# Patient Record
Sex: FEMALE | Race: BLACK OR AFRICAN AMERICAN | NOT HISPANIC OR LATINO | URBAN - METROPOLITAN AREA
[De-identification: names, ages, dates, MRNs, and addresses within clinical notes are randomized per-mention and may not be internally consistent; named-entity substitution may affect disease eponyms.]

---

## 2017-01-08 ENCOUNTER — EMERGENCY (EMERGENCY)
Facility: HOSPITAL | Age: 52
LOS: 1 days | Discharge: HOME | End: 2017-01-08
Attending: EMERGENCY MEDICINE | Admitting: EMERGENCY MEDICINE
Payer: MEDICARE

## 2017-01-08 VITALS
OXYGEN SATURATION: 97 % | SYSTOLIC BLOOD PRESSURE: 151 MMHG | TEMPERATURE: 98 F | WEIGHT: 210.1 LBS | HEART RATE: 84 BPM | RESPIRATION RATE: 18 BRPM | HEIGHT: 65 IN | DIASTOLIC BLOOD PRESSURE: 93 MMHG

## 2017-01-08 VITALS
HEART RATE: 62 BPM | RESPIRATION RATE: 17 BRPM | OXYGEN SATURATION: 98 % | SYSTOLIC BLOOD PRESSURE: 121 MMHG | TEMPERATURE: 98 F | DIASTOLIC BLOOD PRESSURE: 75 MMHG

## 2017-01-08 DIAGNOSIS — I10 ESSENTIAL (PRIMARY) HYPERTENSION: ICD-10-CM

## 2017-01-08 DIAGNOSIS — M25.561 PAIN IN RIGHT KNEE: ICD-10-CM

## 2017-01-08 DIAGNOSIS — Z79.899 OTHER LONG TERM (CURRENT) DRUG THERAPY: ICD-10-CM

## 2017-01-08 DIAGNOSIS — Z88.5 ALLERGY STATUS TO NARCOTIC AGENT: ICD-10-CM

## 2017-01-08 DIAGNOSIS — Z79.2 LONG TERM (CURRENT) USE OF ANTIBIOTICS: ICD-10-CM

## 2017-01-08 DIAGNOSIS — E11.9 TYPE 2 DIABETES MELLITUS WITHOUT COMPLICATIONS: ICD-10-CM

## 2017-01-08 PROCEDURE — 99283 EMERGENCY DEPT VISIT LOW MDM: CPT | Mod: 25

## 2017-01-08 PROCEDURE — 73562 X-RAY EXAM OF KNEE 3: CPT | Mod: 26,RT

## 2017-01-08 PROCEDURE — 73562 X-RAY EXAM OF KNEE 3: CPT

## 2017-01-08 PROCEDURE — 99283 EMERGENCY DEPT VISIT LOW MDM: CPT

## 2017-01-08 RX ORDER — KETOROLAC TROMETHAMINE 30 MG/ML
30 SYRINGE (ML) INJECTION ONCE
Qty: 0 | Refills: 0 | Status: DISCONTINUED | OUTPATIENT
Start: 2017-01-08 | End: 2017-01-08

## 2017-01-08 NOTE — ED ADULT NURSE NOTE - PMH
Essential hypertension  Hypertension  Hyperparathyroidism  Hyperparathyroidism  Nontraumatic extradural hemorrhage  L2-L3  Thyrotoxicosis  Hyperthyroidism  Type 2 diabetes mellitus  Diabetes mellitus

## 2017-01-08 NOTE — ED ADULT TRIAGE NOTE - CHIEF COMPLAINT QUOTE
"I have fluid in my right knee for couple months but pain got really bad yesterday." Pt denies fever, chills. Pt has ambulated to ED.

## 2017-01-08 NOTE — ED PROVIDER NOTE - MEDICAL DECISION MAKING DETAILS
knee pain. neurovascular intact. pain meds given. no evidence of infection no exam. x-ray. no fx. +DJD. d/w ace wrap, cane, pain meds and f/u with ortho

## 2017-01-08 NOTE — ED ADULT NURSE NOTE - OBJECTIVE STATEMENT
Patient presents to the ED complaining of right knee pain, swelling, hardness and pain with ROM. Patient states that the pain and swelling has been going on for 6 months, reports that there is fluid in the knee and she needs a knee replacement. Denies any fever or chills. Swelling noted. Pedal pulse present.

## 2017-01-08 NOTE — ED PROVIDER NOTE - PSH
History of total knee replacement  S/P total knee replacement  Other postprocedural status  S/P spinal surgery

## 2017-01-08 NOTE — ED PROVIDER NOTE - OBJECTIVE STATEMENT
50 y/o female with hx of chronic back pain and arthritis to knee c/o right knee pain. pt states told she needs a knee replacement but has not had it done yet. pt reports increase in swelling past 2 days. no trauma. no fever or chills. no numbness or tingling. no further complaints.

## 2017-03-01 ENCOUNTER — RECORD ABSTRACTING (OUTPATIENT)
Age: 52
End: 2017-03-01

## 2017-03-01 DIAGNOSIS — M15.9 POLYOSTEOARTHRITIS, UNSPECIFIED: ICD-10-CM

## 2017-03-01 DIAGNOSIS — Z80.9 FAMILY HISTORY OF MALIGNANT NEOPLASM, UNSPECIFIED: ICD-10-CM

## 2017-04-05 ENCOUNTER — OUTPATIENT (OUTPATIENT)
Dept: OUTPATIENT SERVICES | Facility: HOSPITAL | Age: 52
LOS: 1 days | End: 2017-04-05

## 2017-04-05 DIAGNOSIS — Z22.321 CARRIER OR SUSPECTED CARRIER OF METHICILLIN SUSCEPTIBLE STAPHYLOCOCCUS AUREUS: ICD-10-CM

## 2017-04-06 ENCOUNTER — RESULT REVIEW (OUTPATIENT)
Age: 52
End: 2017-04-06

## 2017-04-06 VITALS
OXYGEN SATURATION: 99 % | WEIGHT: 206.35 LBS | HEIGHT: 65 IN | HEART RATE: 48 BPM | SYSTOLIC BLOOD PRESSURE: 150 MMHG | TEMPERATURE: 98 F | DIASTOLIC BLOOD PRESSURE: 76 MMHG | RESPIRATION RATE: 16 BRPM

## 2017-04-06 LAB
MRSA PCR RESULT.: NEGATIVE — SIGNIFICANT CHANGE UP
S AUREUS DNA NOSE QL NAA+PROBE: NEGATIVE — SIGNIFICANT CHANGE UP

## 2017-04-06 RX ORDER — AMOXICILLIN 250 MG/5ML
1 SUSPENSION, RECONSTITUTED, ORAL (ML) ORAL
Qty: 0 | Refills: 0 | COMMUNITY

## 2017-04-06 NOTE — PATIENT PROFILE ADULT. - PSH
History of total knee replacement  S/P total knee replacement  Other postprocedural status  S/P spinal surgery History of breast surgery  bilateral- Feb.2016/ March 2016  History of total knee replacement  S/P total knee replacement  Other postprocedural status  S/P spinal surgery

## 2017-04-06 NOTE — PATIENT PROFILE ADULT. - PMH
Essential hypertension  Hypertension  Hyperparathyroidism  Hyperparathyroidism  Nontraumatic extradural hemorrhage  L2-L3  Thyrotoxicosis  Hyperthyroidism  Type 2 diabetes mellitus  Diabetes mellitus  pt states she is prediabetic

## 2017-04-07 ENCOUNTER — INPATIENT (INPATIENT)
Facility: HOSPITAL | Age: 52
LOS: 5 days | Discharge: NURSING, REHAB OR LONG-TERM CARE | DRG: 470 | End: 2017-04-13
Attending: ORTHOPAEDIC SURGERY | Admitting: ORTHOPAEDIC SURGERY
Payer: MEDICARE

## 2017-04-07 ENCOUNTER — APPOINTMENT (OUTPATIENT)
Dept: ORTHOPEDIC SURGERY | Facility: HOSPITAL | Age: 52
End: 2017-04-07

## 2017-04-07 DIAGNOSIS — M17.11 UNILATERAL PRIMARY OSTEOARTHRITIS, RIGHT KNEE: ICD-10-CM

## 2017-04-07 DIAGNOSIS — I10 ESSENTIAL (PRIMARY) HYPERTENSION: ICD-10-CM

## 2017-04-07 DIAGNOSIS — Z98.890 OTHER SPECIFIED POSTPROCEDURAL STATES: Chronic | ICD-10-CM

## 2017-04-07 DIAGNOSIS — J44.9 CHRONIC OBSTRUCTIVE PULMONARY DISEASE, UNSPECIFIED: ICD-10-CM

## 2017-04-07 DIAGNOSIS — E11.9 TYPE 2 DIABETES MELLITUS WITHOUT COMPLICATIONS: ICD-10-CM

## 2017-04-07 DIAGNOSIS — E05.90 THYROTOXICOSIS, UNSPECIFIED WITHOUT THYROTOXIC CRISIS OR STORM: ICD-10-CM

## 2017-04-07 DIAGNOSIS — E21.3 HYPERPARATHYROIDISM, UNSPECIFIED: ICD-10-CM

## 2017-04-07 DIAGNOSIS — E55.9 VITAMIN D DEFICIENCY, UNSPECIFIED: ICD-10-CM

## 2017-04-07 LAB
DOHLE BOD BLD QL SMEAR: SIGNIFICANT CHANGE UP
EOSINOPHIL NFR BLD AUTO: 1 % — SIGNIFICANT CHANGE UP (ref 0–6)
HCT VFR BLD CALC: 39.3 % — SIGNIFICANT CHANGE UP (ref 34.5–45)
HGB BLD-MCNC: 13 G/DL — SIGNIFICANT CHANGE UP (ref 11.5–15.5)
LYMPHOCYTES # BLD AUTO: 16 % — SIGNIFICANT CHANGE UP (ref 13–44)
MANUAL DIF COMMENT BLD-IMP: SIGNIFICANT CHANGE UP
MANUAL SMEAR VERIFICATION: SIGNIFICANT CHANGE UP
MCHC RBC-ENTMCNC: 27.7 PG — SIGNIFICANT CHANGE UP (ref 27–34)
MCHC RBC-ENTMCNC: 33.1 G/DL — SIGNIFICANT CHANGE UP (ref 32–36)
MCV RBC AUTO: 83.8 FL — SIGNIFICANT CHANGE UP (ref 80–100)
METAMYELOCYTES # FLD: 2 % — HIGH
MONOCYTES NFR BLD AUTO: 4 % — SIGNIFICANT CHANGE UP (ref 2–14)
MYELOCYTES NFR BLD: 1 % — HIGH
NEUTROPHILS NFR BLD AUTO: 76 % — SIGNIFICANT CHANGE UP (ref 43–77)
NRBC # BLD: 1 /100 WBCS — HIGH
PLAT MORPH BLD: NORMAL — SIGNIFICANT CHANGE UP
PLATELET # BLD AUTO: 194 K/UL — SIGNIFICANT CHANGE UP (ref 150–400)
RBC # BLD: 4.69 M/UL — SIGNIFICANT CHANGE UP (ref 3.8–5.2)
RBC # FLD: 13.8 % — SIGNIFICANT CHANGE UP (ref 10.3–16.9)
RBC BLD AUTO: NORMAL — SIGNIFICANT CHANGE UP
WBC # BLD: 7.6 K/UL — SIGNIFICANT CHANGE UP (ref 3.8–10.5)
WBC # FLD AUTO: 7.6 K/UL — SIGNIFICANT CHANGE UP (ref 3.8–10.5)

## 2017-04-07 PROCEDURE — 27447 TOTAL KNEE ARTHROPLASTY: CPT | Mod: RT

## 2017-04-07 PROCEDURE — 20985 CPTR-ASST DIR MS PX: CPT | Mod: RT

## 2017-04-07 PROCEDURE — 73560 X-RAY EXAM OF KNEE 1 OR 2: CPT | Mod: 26,RT

## 2017-04-07 RX ORDER — HYDROMORPHONE HYDROCHLORIDE 2 MG/ML
1 INJECTION INTRAMUSCULAR; INTRAVENOUS; SUBCUTANEOUS
Qty: 0 | Refills: 0 | Status: DISCONTINUED | OUTPATIENT
Start: 2017-04-07 | End: 2017-04-08

## 2017-04-07 RX ORDER — DEXTROSE 50 % IN WATER 50 %
25 SYRINGE (ML) INTRAVENOUS ONCE
Qty: 0 | Refills: 0 | Status: DISCONTINUED | OUTPATIENT
Start: 2017-04-07 | End: 2017-04-13

## 2017-04-07 RX ORDER — MAGNESIUM HYDROXIDE 400 MG/1
30 TABLET, CHEWABLE ORAL DAILY
Qty: 0 | Refills: 0 | Status: DISCONTINUED | OUTPATIENT
Start: 2017-04-07 | End: 2017-04-13

## 2017-04-07 RX ORDER — HYDROMORPHONE HYDROCHLORIDE 2 MG/ML
1.5 INJECTION INTRAMUSCULAR; INTRAVENOUS; SUBCUTANEOUS
Qty: 0 | Refills: 0 | Status: DISCONTINUED | OUTPATIENT
Start: 2017-04-07 | End: 2017-04-08

## 2017-04-07 RX ORDER — FERROUS SULFATE 325(65) MG
325 TABLET ORAL
Qty: 0 | Refills: 0 | Status: DISCONTINUED | OUTPATIENT
Start: 2017-04-07 | End: 2017-04-13

## 2017-04-07 RX ORDER — ACETAMINOPHEN 500 MG
1000 TABLET ORAL ONCE
Qty: 0 | Refills: 0 | Status: COMPLETED | OUTPATIENT
Start: 2017-04-07 | End: 2017-04-07

## 2017-04-07 RX ORDER — SODIUM CHLORIDE 9 MG/ML
1000 INJECTION, SOLUTION INTRAVENOUS
Qty: 0 | Refills: 0 | Status: DISCONTINUED | OUTPATIENT
Start: 2017-04-07 | End: 2017-04-13

## 2017-04-07 RX ORDER — DEXTROSE 50 % IN WATER 50 %
12.5 SYRINGE (ML) INTRAVENOUS ONCE
Qty: 0 | Refills: 0 | Status: DISCONTINUED | OUTPATIENT
Start: 2017-04-07 | End: 2017-04-13

## 2017-04-07 RX ORDER — ASPIRIN/CALCIUM CARB/MAGNESIUM 324 MG
325 TABLET ORAL
Qty: 0 | Refills: 0 | Status: DISCONTINUED | OUTPATIENT
Start: 2017-04-07 | End: 2017-04-13

## 2017-04-07 RX ORDER — GLUCAGON INJECTION, SOLUTION 0.5 MG/.1ML
1 INJECTION, SOLUTION SUBCUTANEOUS ONCE
Qty: 0 | Refills: 0 | Status: DISCONTINUED | OUTPATIENT
Start: 2017-04-07 | End: 2017-04-13

## 2017-04-07 RX ORDER — ATORVASTATIN CALCIUM 80 MG/1
1 TABLET, FILM COATED ORAL
Qty: 0 | Refills: 0 | COMMUNITY

## 2017-04-07 RX ORDER — ONDANSETRON 8 MG/1
4 TABLET, FILM COATED ORAL EVERY 6 HOURS
Qty: 0 | Refills: 0 | Status: DISCONTINUED | OUTPATIENT
Start: 2017-04-07 | End: 2017-04-07

## 2017-04-07 RX ORDER — ZOLPIDEM TARTRATE 10 MG/1
5 TABLET ORAL AT BEDTIME
Qty: 0 | Refills: 0 | Status: DISCONTINUED | OUTPATIENT
Start: 2017-04-07 | End: 2017-04-13

## 2017-04-07 RX ORDER — ONDANSETRON 8 MG/1
4 TABLET, FILM COATED ORAL EVERY 6 HOURS
Qty: 0 | Refills: 0 | Status: DISCONTINUED | OUTPATIENT
Start: 2017-04-07 | End: 2017-04-13

## 2017-04-07 RX ORDER — DEXTROSE 50 % IN WATER 50 %
1 SYRINGE (ML) INTRAVENOUS ONCE
Qty: 0 | Refills: 0 | Status: DISCONTINUED | OUTPATIENT
Start: 2017-04-07 | End: 2017-04-13

## 2017-04-07 RX ORDER — DOCUSATE SODIUM 100 MG
100 CAPSULE ORAL THREE TIMES A DAY
Qty: 0 | Refills: 0 | Status: DISCONTINUED | OUTPATIENT
Start: 2017-04-07 | End: 2017-04-13

## 2017-04-07 RX ORDER — POLYETHYLENE GLYCOL 3350 17 G/17G
17 POWDER, FOR SOLUTION ORAL DAILY
Qty: 0 | Refills: 0 | Status: DISCONTINUED | OUTPATIENT
Start: 2017-04-07 | End: 2017-04-13

## 2017-04-07 RX ORDER — INSULIN LISPRO 100/ML
VIAL (ML) SUBCUTANEOUS
Qty: 0 | Refills: 0 | Status: DISCONTINUED | OUTPATIENT
Start: 2017-04-07 | End: 2017-04-13

## 2017-04-07 RX ORDER — PANTOPRAZOLE SODIUM 20 MG/1
40 TABLET, DELAYED RELEASE ORAL DAILY
Qty: 0 | Refills: 0 | Status: DISCONTINUED | OUTPATIENT
Start: 2017-04-07 | End: 2017-04-13

## 2017-04-07 RX ORDER — CEFAZOLIN SODIUM 1 G
2000 VIAL (EA) INJECTION EVERY 8 HOURS
Qty: 0 | Refills: 0 | Status: COMPLETED | OUTPATIENT
Start: 2017-04-07 | End: 2017-04-08

## 2017-04-07 RX ORDER — NALOXONE HYDROCHLORIDE 4 MG/.1ML
0.1 SPRAY NASAL
Qty: 0 | Refills: 0 | Status: DISCONTINUED | OUTPATIENT
Start: 2017-04-07 | End: 2017-04-13

## 2017-04-07 RX ORDER — HYDROMORPHONE HYDROCHLORIDE 2 MG/ML
0.5 INJECTION INTRAMUSCULAR; INTRAVENOUS; SUBCUTANEOUS
Qty: 0 | Refills: 0 | Status: DISCONTINUED | OUTPATIENT
Start: 2017-04-07 | End: 2017-04-07

## 2017-04-07 RX ORDER — ATORVASTATIN CALCIUM 80 MG/1
20 TABLET, FILM COATED ORAL AT BEDTIME
Qty: 0 | Refills: 0 | Status: DISCONTINUED | OUTPATIENT
Start: 2017-04-07 | End: 2017-04-13

## 2017-04-07 RX ORDER — HYDROMORPHONE HYDROCHLORIDE 2 MG/ML
30 INJECTION INTRAMUSCULAR; INTRAVENOUS; SUBCUTANEOUS
Qty: 0 | Refills: 0 | Status: DISCONTINUED | OUTPATIENT
Start: 2017-04-07 | End: 2017-04-08

## 2017-04-07 RX ORDER — BUPIVACAINE 13.3 MG/ML
20 INJECTION, SUSPENSION, LIPOSOMAL INFILTRATION ONCE
Qty: 0 | Refills: 0 | Status: DISCONTINUED | OUTPATIENT
Start: 2017-04-07 | End: 2017-04-13

## 2017-04-07 RX ORDER — SENNA PLUS 8.6 MG/1
2 TABLET ORAL AT BEDTIME
Qty: 0 | Refills: 0 | Status: DISCONTINUED | OUTPATIENT
Start: 2017-04-07 | End: 2017-04-13

## 2017-04-07 RX ORDER — ATENOLOL 25 MG/1
50 TABLET ORAL
Qty: 0 | Refills: 0 | Status: DISCONTINUED | OUTPATIENT
Start: 2017-04-07 | End: 2017-04-13

## 2017-04-07 RX ADMIN — Medication 325 MILLIGRAM(S): at 17:25

## 2017-04-07 RX ADMIN — HYDROMORPHONE HYDROCHLORIDE 30 MILLILITER(S): 2 INJECTION INTRAMUSCULAR; INTRAVENOUS; SUBCUTANEOUS at 12:22

## 2017-04-07 RX ADMIN — HYDROMORPHONE HYDROCHLORIDE 0.5 MILLIGRAM(S): 2 INJECTION INTRAMUSCULAR; INTRAVENOUS; SUBCUTANEOUS at 11:55

## 2017-04-07 RX ADMIN — HYDROMORPHONE HYDROCHLORIDE 1.5 MILLIGRAM(S): 2 INJECTION INTRAMUSCULAR; INTRAVENOUS; SUBCUTANEOUS at 21:15

## 2017-04-07 RX ADMIN — HYDROMORPHONE HYDROCHLORIDE 0.5 MILLIGRAM(S): 2 INJECTION INTRAMUSCULAR; INTRAVENOUS; SUBCUTANEOUS at 11:42

## 2017-04-07 RX ADMIN — HYDROMORPHONE HYDROCHLORIDE 0.5 MILLIGRAM(S): 2 INJECTION INTRAMUSCULAR; INTRAVENOUS; SUBCUTANEOUS at 12:12

## 2017-04-07 RX ADMIN — Medication 25 MILLIGRAM(S): at 21:04

## 2017-04-07 RX ADMIN — ATORVASTATIN CALCIUM 20 MILLIGRAM(S): 80 TABLET, FILM COATED ORAL at 21:04

## 2017-04-07 RX ADMIN — Medication 100 MILLIGRAM(S): at 17:26

## 2017-04-07 RX ADMIN — Medication 1 TABLET(S): at 17:25

## 2017-04-07 RX ADMIN — HYDROMORPHONE HYDROCHLORIDE 0.5 MILLIGRAM(S): 2 INJECTION INTRAMUSCULAR; INTRAVENOUS; SUBCUTANEOUS at 12:24

## 2017-04-07 RX ADMIN — Medication 400 MILLIGRAM(S): at 22:14

## 2017-04-07 RX ADMIN — Medication 1000 MILLIGRAM(S): at 22:30

## 2017-04-07 RX ADMIN — Medication 25 MILLIGRAM(S): at 17:27

## 2017-04-07 RX ADMIN — ATENOLOL 50 MILLIGRAM(S): 25 TABLET ORAL at 17:25

## 2017-04-07 RX ADMIN — PANTOPRAZOLE SODIUM 40 MILLIGRAM(S): 20 TABLET, DELAYED RELEASE ORAL at 17:25

## 2017-04-07 RX ADMIN — ONDANSETRON 4 MILLIGRAM(S): 8 TABLET, FILM COATED ORAL at 18:12

## 2017-04-07 NOTE — H&P ADULT - PSH
History of breast surgery  bilateral- Feb.2016/ March 2016  History of total knee replacement  S/P total knee replacement  Other postprocedural status  S/P spinal surgery

## 2017-04-07 NOTE — H&P ADULT - FAMILY HISTORY
Father  Still living? Unknown  Family history of ischemic heart disease, Age at diagnosis: Age Unknown     Mother  Still living? Unknown  Family history of malignant neoplasm, Age at diagnosis: Age Unknown

## 2017-04-07 NOTE — CONSULT NOTE ADULT - SUBJECTIVE AND OBJECTIVE BOX
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  Essential hypertension: Hypertension  Other postprocedural status: S/P spinal surgery  History of total knee replacement: S/P total knee replacement      REVIEW OF SYSTEMS    General:  malaise	  Skin/Breast: normal  Ophthalmologic: negative  ENMT:	normal  Respiratory and Thorax: normal  Cardiovascular:	normal  Gastrointestinal:	normal  Genitourinary:	normal  Musculoskeletal:	swelling   Neurological:	normal  Psychiatric:	normal  Hematology/Lymphatics:	negative  Endocrine:	negative  Allergic/Immunologic:	negative      MEDICATIONS  (STANDING):    MEDICATIONS  (PRN):      Allergies    No Known Allergies    Intolerances    OxyContin (Pruritus)      SOCIAL HISTORY:    FAMILY HISTORY:  Family history of malignant neoplasm: Family history of cancer  Family history of ischemic heart disease: Family history of MI (myocardial infarction)      PHYSICAL EXAM:  Daily     Daily     Vital Signs Last 24 Hrs  T(C): --  T(F): --  HR: --  BP: --  BP(mean): --  RR: --  SpO2: --    Constitutional: WDWNF in NAD  Eyes: conj pale  ENMT: negative  Neck: supple  Breasts: not examined   Back: negative  Respiratory: clear to P&A  Cardiovascular: no MRGT or H  Gastrointestinal: normal bowel sounds  Genitourinary: neg  Rectal: not examined  Extremities: edema          leg  Vascular: normal  Neurological: normal  Skin: negative  Lymph Nodes: negative  Musculoskeletal:   decreased ROM    Psychiatric: anxiety      LABS: HPI:  52 year old female with osteoarthritis right knee with moderate knee pain, deformity, decreased ROM and unsteady gait progressively increased for years. MRI confirmed diagnosis.  Symptoms worse with stairs and after prolonged imoblity.      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  Essential hypertension: Hypertension  Other postprocedural status: S/P spinal surgery  History of total knee replacement: S/P total knee replacement      REVIEW OF SYSTEMS    General:  malaise	  Skin/Breast: normal  Ophthalmologic: negative  ENMT:	normal  Respiratory and Thorax: normal  Cardiovascular:	normal  Gastrointestinal:	normal  Genitourinary:	normal  Musculoskeletal: right knee swelling   Neurological:	normal  Psychiatric:	normal  Hematology/Lymphatics:	negative  Endocrine:	negative  Allergic/Immunologic:	negative      MEDICATIONS    Percocet 5/325 325 mg-5 mg oral tablet 1 tab(s) orally every 6 hours MDD:4 tabs  · 	atorvastatin 20 mg oral tablet 1 tab(s) orally once a day  · 	atenolol 50 mg oral tablet 50 milligram(s) orally 2 times a day  · 	hydroCHLOROthiazide 50 mg oral tablet 1 tab(s) orally once a day  · 	zolpidem 10 mg oral tablet 1 tab(s) orally once a day hs prn         Allergies    No Known Allergies    Intolerances    OxyContin (Pruritus)      SOCIAL HISTORY: no cigs  quit 2015 social ETOH    FAMILY HISTORY:  Family history of malignant neoplasm: Family history of cancer  Family history of ischemic heart disease: Family history of MI (myocardial infarction)      PHYSICAL EXAM:          Vital Signs Last 24 Hrs  T(C): --  T(F): --98.1  HR: --68  BP: --120/80  BP(mean): --  RR: --16  SpO2: --    Constitutional: WDWNF in NAD  Eyes: conj pale  ENMT: negative  Neck: supple  Breasts: not examined   Back: negative  Respiratory: clear to P&A  Cardiovascular: no MRGT or H  Gastrointestinal: normal bowel sounds  Genitourinary: neg  Rectal: not examined  Extremities: edema right knee  Vascular: normal  Neurological: normal  Skin: negative  Lymph Nodes: negative  Musculoskeletal:   decreased ROM  right knee  Psychiatric: anxiety      LABS:

## 2017-04-07 NOTE — CONSULT NOTE ADULT - SUBJECTIVE AND OBJECTIVE BOX
Pain Management Consult Note - [facility] [phone]    Chief Complaint:  Right knee pain    HPI:  53 y/o female with worsening right knee pain and is seen S/P R TKR today.  Pt. was seeing Dr. HERMANN Hartmann for pain management as an outpatient and was taking oxycodone 10/325 TID since Monday of this week.  States prior to that Dr. Hartmann started her on methadone 5 mg TID 3 weeks ago and the dose was increased to 10 mg TID on 3/20.  States she stopped taking it on Monday, as it was not helping the pain.  Pt. has had previous back surgery and was unable to get spinal anesthesia for her knee replacement.      Pain is __x_ sharp ____dull ___burning ___achy ___ Intensity: ____ mild ___mod ___severe     Location _x___surgical site ____cervical _____lumbar ____abd ____upper ext__x__lower ext    Worse with __x__activity _x___movement _____physical therapy___ Rest    Improved with __x__medication _x___rest ____physical therapy    ROS: Const:  _-__febrile   Eyes:___ENT:_+__CV: _-__chest pain  Resp: _-___sob  GI:__-_nausea _-__vomiting -___abd pain _+__npo ___clears __full diet __bm  :__-_ Musk: _+__pain ___spasm  Skin:_-__ Neuro:  -___lmilwazw__-_nmigfbizh__-_ numbness __-_weakness ___paresth  Psych:__anxiety  Endo:_+__ Heme:_-__Allergy:_____NKDA____, ___all others reviewed and negative    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  Essential hypertension: Hypertension  History of breast surgery: bilateral- Feb.2016/ March 2016  Other postprocedural status: S/P spinal surgery  History of total knee replacement: S/P total knee replacement      SH: quit smoking 2015___Tobacco   _denies__Alcohol                          FH:FAMILY HISTORY:  Family history of malignant neoplasm: Family history of cancer  Family history of ischemic heart disease: Family history of MI (myocardial infarction)      BUpivacaine liposome 1.3% Injectable (no eMAR) 20milliLiter(s) Local Injection once  atorvastatin 20milliGRAM(s) Oral at bedtime  zolpidem 5milliGRAM(s) Oral at bedtime PRN  ATENolol  Tablet 50milliGRAM(s) Oral two times a day  hydrochlorothiazide   Tablet 50milliGRAM(s) Oral daily  HYDROmorphone  Injectable 0.5milliGRAM(s) IV Push every 10 minutes PRN  HYDROmorphone  Injectable 1milliGRAM(s) IV Push every 10 minutes PRN  HYDROmorphone PCA (1 mG/mL) 30milliLiter(s) PCA Continuous PCA Continuous  HYDROmorphone PCA (1 mG/mL) Rescue Clinician Bolus 1.5milliGRAM(s) IV Push every 2 hours PRN  naloxone Injectable 0.1milliGRAM(s) IV Push every 3 minutes PRN  ondansetron Injectable 4milliGRAM(s) IV Push every 6 hours PRN  acetaminophen  IVPB. 1000milliGRAM(s) IV Intermittent once  pregabalin 25milliGRAM(s) Oral three times a day      T(C): 36.8, Max: 36.8 (04-07 @ 11:07)  HR: 72 (48 - 73)  BP: 168/87 (125/71 - 174/93)  RR: 16 (16 - 16)  SpO2: 98% (98% - 99%)  Wt(kg): --    T(C): 36.8, Max: 36.8 (04-07 @ 11:07)  HR: 72 (48 - 73)  BP: 168/87 (125/71 - 174/93)  RR: 16 (16 - 16)  SpO2: 98% (98% - 99%)  Wt(kg): --    T(C): 36.8, Max: 36.8 (04-07 @ 11:07)  HR: 72 (48 - 73)  BP: 168/87 (125/71 - 174/93)  RR: 16 (16 - 16)  SpO2: 98% (98% - 99%)  Wt(kg): --    PHYSICAL EXAM:  Gen Appearance: _x_no acute distress __x_appropriate        Neuro: _x__SILT feet__x__ EOM Intact Psych: AAOX_3_, _x__mood/affect appropriate        Eyes: x___conjunctiva WNL  __x___ Pupils equal and round        ENT: _x__ears and nose atraumaticx___ Hearing grossly intact        Neck: ___trachea midline, no visible masses ___thyroid without palpable mass    Resp: __x_Nml WOB____No tactile fremitus ___clear to auscultation    Cardio: ___extremities free from edema ____pedal pulses palpable    GI/Abdomen: __x_soft __x___ Nontender______Nondistended_____HSM    Lymphatic: ___no palpable nodes in neck  ___no palpable nodes calves and feet    Skin/Wound: ___Incision, __x_Dressing c/d/i to right knee   ____surrounding tissues soft,  ___drain/chest tube present____    Muscular: EHL __5_/5  Gastrocnemius___/5    _5__absent clubbing/cyanosis      ASSESSMENT: This is a 52y old Female with a history of   Family history of malignant neoplasm: Family history of cancer  Family history of ischemic heart disease: Family history of MI (myocardial infarction)  Thyrotoxicosis: Hyperthyroidism  Hyperparathyroidism: Hyperparathyroidism  Type 2 diabetes mellitus: Diabetes mellitus  Nontraumatic extradural hemorrhage: 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  Dilaudid PCA 0/0.4mg/6min with 1.5 mg IV q2h prn  2.  Tylenol 1000 mg IV x1  3.  Lyrica 25 mg po TID

## 2017-04-08 LAB
ANION GAP SERPL CALC-SCNC: 8 MMOL/L — LOW (ref 9–16)
BUN SERPL-MCNC: 7 MG/DL — SIGNIFICANT CHANGE UP (ref 7–23)
CALCIUM SERPL-MCNC: 10.2 MG/DL — SIGNIFICANT CHANGE UP (ref 8.5–10.5)
CHLORIDE SERPL-SCNC: 104 MMOL/L — SIGNIFICANT CHANGE UP (ref 96–108)
CO2 SERPL-SCNC: 24 MMOL/L — SIGNIFICANT CHANGE UP (ref 22–31)
CREAT SERPL-MCNC: 0.74 MG/DL — SIGNIFICANT CHANGE UP (ref 0.5–1.3)
GLUCOSE SERPL-MCNC: 148 MG/DL — HIGH (ref 70–99)
HBA1C BLD-MCNC: 7.7 % — HIGH (ref 4.8–5.6)
HCT VFR BLD CALC: 33.5 % — LOW (ref 34.5–45)
HGB BLD-MCNC: 10.8 G/DL — LOW (ref 11.5–15.5)
MCHC RBC-ENTMCNC: 27.1 PG — SIGNIFICANT CHANGE UP (ref 27–34)
MCHC RBC-ENTMCNC: 32.2 G/DL — SIGNIFICANT CHANGE UP (ref 32–36)
MCV RBC AUTO: 84 FL — SIGNIFICANT CHANGE UP (ref 80–100)
PLATELET # BLD AUTO: 370 K/UL — SIGNIFICANT CHANGE UP (ref 150–400)
POTASSIUM SERPL-MCNC: 3.8 MMOL/L — SIGNIFICANT CHANGE UP (ref 3.5–5.3)
POTASSIUM SERPL-SCNC: 3.8 MMOL/L — SIGNIFICANT CHANGE UP (ref 3.5–5.3)
RBC # BLD: 3.99 M/UL — SIGNIFICANT CHANGE UP (ref 3.8–5.2)
RBC # FLD: 13.8 % — SIGNIFICANT CHANGE UP (ref 10.3–16.9)
SODIUM SERPL-SCNC: 136 MMOL/L — SIGNIFICANT CHANGE UP (ref 135–145)
WBC # BLD: 11.3 K/UL — HIGH (ref 3.8–10.5)
WBC # FLD AUTO: 11.3 K/UL — HIGH (ref 3.8–10.5)

## 2017-04-08 RX ORDER — HYDROMORPHONE HYDROCHLORIDE 2 MG/ML
30 INJECTION INTRAMUSCULAR; INTRAVENOUS; SUBCUTANEOUS
Qty: 0 | Refills: 0 | Status: DISCONTINUED | OUTPATIENT
Start: 2017-04-08 | End: 2017-04-10

## 2017-04-08 RX ORDER — HYDROMORPHONE HYDROCHLORIDE 2 MG/ML
1.5 INJECTION INTRAMUSCULAR; INTRAVENOUS; SUBCUTANEOUS
Qty: 0 | Refills: 0 | Status: DISCONTINUED | OUTPATIENT
Start: 2017-04-08 | End: 2017-04-10

## 2017-04-08 RX ORDER — HYDROMORPHONE HYDROCHLORIDE 2 MG/ML
1.5 INJECTION INTRAMUSCULAR; INTRAVENOUS; SUBCUTANEOUS
Qty: 0 | Refills: 0 | Status: DISCONTINUED | OUTPATIENT
Start: 2017-04-08 | End: 2017-04-08

## 2017-04-08 RX ORDER — ACETAMINOPHEN 500 MG
650 TABLET ORAL EVERY 6 HOURS
Qty: 0 | Refills: 0 | Status: DISCONTINUED | OUTPATIENT
Start: 2017-04-08 | End: 2017-04-13

## 2017-04-08 RX ADMIN — HYDROMORPHONE HYDROCHLORIDE 1.5 MILLIGRAM(S): 2 INJECTION INTRAMUSCULAR; INTRAVENOUS; SUBCUTANEOUS at 05:10

## 2017-04-08 RX ADMIN — ATORVASTATIN CALCIUM 20 MILLIGRAM(S): 80 TABLET, FILM COATED ORAL at 20:44

## 2017-04-08 RX ADMIN — PANTOPRAZOLE SODIUM 40 MILLIGRAM(S): 20 TABLET, DELAYED RELEASE ORAL at 14:40

## 2017-04-08 RX ADMIN — Medication 1: at 17:47

## 2017-04-08 RX ADMIN — HYDROMORPHONE HYDROCHLORIDE 30 MILLILITER(S): 2 INJECTION INTRAMUSCULAR; INTRAVENOUS; SUBCUTANEOUS at 18:17

## 2017-04-08 RX ADMIN — ATENOLOL 50 MILLIGRAM(S): 25 TABLET ORAL at 17:51

## 2017-04-08 RX ADMIN — Medication 25 MILLIGRAM(S): at 14:40

## 2017-04-08 RX ADMIN — Medication 100 MILLIGRAM(S): at 00:22

## 2017-04-08 RX ADMIN — Medication 100 MILLIGRAM(S): at 05:35

## 2017-04-08 RX ADMIN — HYDROMORPHONE HYDROCHLORIDE 1.5 MILLIGRAM(S): 2 INJECTION INTRAMUSCULAR; INTRAVENOUS; SUBCUTANEOUS at 23:10

## 2017-04-08 RX ADMIN — Medication 325 MILLIGRAM(S): at 17:49

## 2017-04-08 RX ADMIN — Medication 325 MILLIGRAM(S): at 14:41

## 2017-04-08 RX ADMIN — Medication 100 MILLIGRAM(S): at 14:40

## 2017-04-08 RX ADMIN — ONDANSETRON 4 MILLIGRAM(S): 8 TABLET, FILM COATED ORAL at 05:45

## 2017-04-08 RX ADMIN — Medication 325 MILLIGRAM(S): at 17:50

## 2017-04-08 RX ADMIN — HYDROMORPHONE HYDROCHLORIDE 1.5 MILLIGRAM(S): 2 INJECTION INTRAMUSCULAR; INTRAVENOUS; SUBCUTANEOUS at 00:30

## 2017-04-08 RX ADMIN — Medication 25 MILLIGRAM(S): at 20:44

## 2017-04-08 RX ADMIN — POLYETHYLENE GLYCOL 3350 17 GRAM(S): 17 POWDER, FOR SOLUTION ORAL at 17:50

## 2017-04-08 RX ADMIN — HYDROMORPHONE HYDROCHLORIDE 1.5 MILLIGRAM(S): 2 INJECTION INTRAMUSCULAR; INTRAVENOUS; SUBCUTANEOUS at 18:16

## 2017-04-08 RX ADMIN — HYDROMORPHONE HYDROCHLORIDE 1.5 MILLIGRAM(S): 2 INJECTION INTRAMUSCULAR; INTRAVENOUS; SUBCUTANEOUS at 21:00

## 2017-04-08 RX ADMIN — Medication 2: at 21:00

## 2017-04-08 RX ADMIN — ATENOLOL 50 MILLIGRAM(S): 25 TABLET ORAL at 05:35

## 2017-04-08 RX ADMIN — HYDROMORPHONE HYDROCHLORIDE 1.5 MILLIGRAM(S): 2 INJECTION INTRAMUSCULAR; INTRAVENOUS; SUBCUTANEOUS at 10:11

## 2017-04-08 RX ADMIN — Medication 25 MILLIGRAM(S): at 05:35

## 2017-04-08 RX ADMIN — Medication 325 MILLIGRAM(S): at 05:35

## 2017-04-08 RX ADMIN — HYDROMORPHONE HYDROCHLORIDE 1.5 MILLIGRAM(S): 2 INJECTION INTRAMUSCULAR; INTRAVENOUS; SUBCUTANEOUS at 07:19

## 2017-04-08 RX ADMIN — HYDROMORPHONE HYDROCHLORIDE 30 MILLILITER(S): 2 INJECTION INTRAMUSCULAR; INTRAVENOUS; SUBCUTANEOUS at 07:31

## 2017-04-08 RX ADMIN — Medication 1 TABLET(S): at 14:39

## 2017-04-08 RX ADMIN — Medication 650 MILLIGRAM(S): at 21:33

## 2017-04-08 RX ADMIN — Medication 100 MILLIGRAM(S): at 20:45

## 2017-04-08 NOTE — PROGRESS NOTE ADULT - SUBJECTIVE AND OBJECTIVE BOX
S: Patient is doing well this morning.  Pain moderately controlled.  Had a bout of nausea when she attempted to have some broth last night.  Has not attempted to eat anything else.  Nausea medication helped.  Ambulated in room with PT yesterday.  Urinating well, but has not passed flatus yet.  Denies F/C/N/V/SOB/CP.    O:  VSS  Dressing c/d/i with ice bags in place  EHL/FHL 5/5  SILT in sural/saphenous/peroneals/tibial n  Pedal pulses 2+/4  Calves supple, NTTP    1.  R knee DJD s/p R TKA POD#1  Continue pain control per pain management  ASA and SCDs for DVT prophylaxis  Continue PT with WBAT  Maintain dressing c/d/i  Disposition:  Home in 1-2 days    2.  Acute Blood Loss Anemia - asymptomatic  Monitor H/H    3.  Leukocytosis  Likely reactive from surgery, will monitor

## 2017-04-08 NOTE — PROGRESS NOTE ADULT - SUBJECTIVE AND OBJECTIVE BOX
HPI:  52 year old female with osteoarthritis right knee with moderate knee pain, deformity, decreased ROM and unsteady gait progressively increased for years. MRI confirmed diagnosis.  Symptoms worse with stairs and after prolonged imobility.  Patient day #1 post op right TKR with moderate knee pain and malaise.      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 1000milliLiter(s) IV Continuous <Continuous>  aspirin enteric coated 325milliGRAM(s) Oral two times a day  pantoprazole    Tablet 40milliGRAM(s) Oral daily  polyethylene glycol 3350 17Gram(s) Oral daily  docusate sodium 100milliGRAM(s) Oral three times a day  ferrous    sulfate 325milliGRAM(s) Oral three times a day with meals  multivitamin 1Tablet(s) Oral daily  insulin lispro (HumaLOG) corrective regimen sliding scale  SubCutaneous Before meals and at bedtime  dextrose 5%. 1000milliLiter(s) IV Continuous <Continuous>  dextrose 50% Injectable 12.5Gram(s) IV Push once  dextrose 50% Injectable 25Gram(s) IV Push once  dextrose 50% Injectable 25Gram(s) IV Push once  BUpivacaine liposome 1.3% Injectable (no eMAR) 20milliLiter(s) Local Injection once  atorvastatin 20milliGRAM(s) Oral at bedtime  ATENolol  Tablet 50milliGRAM(s) Oral two times a day  hydrochlorothiazide   Tablet 50milliGRAM(s) Oral daily  HYDROmorphone PCA (1 mG/mL) 30milliLiter(s) PCA Continuous PCA Continuous  pregabalin 25milliGRAM(s) Oral three times a day    MEDICATIONS  (PRN):  aluminum hydroxide/magnesium hydroxide/simethicone Suspension 30milliLiter(s) Oral four times a day PRN Indigestion  magnesium hydroxide Suspension 30milliLiter(s) Oral daily PRN Constipation  senna 2Tablet(s) Oral at bedtime PRN Constipation  dextrose Gel 1Dose(s) Oral once PRN Blood Glucose LESS THAN 70 milliGRAM(s)/deciliter  glucagon  Injectable 1milliGRAM(s) IntraMuscular once PRN Glucose LESS THAN 70 milligrams/deciliter  zolpidem 5milliGRAM(s) Oral at bedtime PRN Insomnia  HYDROmorphone  Injectable 1milliGRAM(s) IV Push every 10 minutes PRN Severe Pain (7 - 10)  HYDROmorphone PCA (1 mG/mL) Rescue Clinician Bolus 1.5milliGRAM(s) IV Push every 2 hours PRN for Pain Scale GREATER THAN 6  naloxone Injectable 0.1milliGRAM(s) IV Push every 3 minutes PRN For ANY of the following changes in patient status:  A. RR LESS THAN 10 breaths per minute, B. Oxygen saturation LESS THAN 90%, C. Sedation score of 6  ondansetron Injectable 4milliGRAM(s) IV Push every 6 hours PRN Nausea      Allergies    No Known Allergies    Intolerances    OxyContin (Pruritus)      REVIEW OF SYSTEMS    General:  malaise	  Skin/Breast: normal  Ophthalmologic: negative  ENMT:	normal  Respiratory and Thorax: normal  Cardiovascular:	normal  Gastrointestinal:	normal  Genitourinary:	normal  Musculoskeletal: right knee swelling   Neurological:	normal  Psychiatric:	normal  Hematology/Lymphatics:	negative  Endocrine:	negative  Allergic/Immunologic:	negative      PHYSICAL EXAM:    Vital Signs Last 24 Hrs  T(C): 37, Max: 37 (04-08 @ 05:09)  T(F): 98.6, Max: 98.6 (04-08 @ 05:09)  HR: 66 (49 - 73)  BP: 177/86 (125/71 - 177/86)  BP(mean): --  RR: 16 (15 - 18)  SpO2: 98% (95% - 99%)    Constitutional: WDWNF in NAD  Eyes: conj pale  ENMT: negative  Neck: supple  Breasts: not examined   Back: negative  Respiratory: clear to P&A  Cardiovascular: no MRGT or H  Gastrointestinal: normal bowel sounds  Genitourinary: neg  Rectal: not examined  Extremities: edema right leg  Vascular: normal  Neurological: normal  Skin: negative  Lymph Nodes: negative  Musculoskeletal:   decreased ROM right knee   Psychiatric: anxiety                        13.0   7.6   )-----------( 194      ( 07 Apr 2017 11:54 )             39.3

## 2017-04-08 NOTE — PROGRESS NOTE ADULT - SUBJECTIVE AND OBJECTIVE BOX
Pain Management Progress Note - [facility] [phone]    HPI:  had pain controlled by PCA.  tolerating PO today          Pertinent PMH: Pain at: ___Back ___Neck__x_Knee ___Hip ___Shoulder ___ Opioid tolerance    Pain is __x_ sharp _x___dull ___burning ___achy ___ Intensity: ____ mild ____mod ____severe     Location __x___surgical site _____cervical _____lumbar ____abd _____upper ext____lower ext    Worse with ___x_activity _x___movement _____physical therapy___ Rest    Improved with _x__medication _x___rest ____physical therapy    lactated ringers.  aspirin enteric coated  ceFAZolin   IVPB [completed]  aluminum hydroxide/magnesium hydroxide/simethicone Suspension  ondansetron Injectable [discontinued]  pantoprazole    Tablet  magnesium hydroxide Suspension  polyethylene glycol 3350  bisacodyl Suppository  senna  docusate sodium  ferrous    sulfate  multivitamin  insulin lispro (HumaLOG) corrective regimen sliding scale  dextrose 5%.  dextrose Gel  dextrose 50% Injectable  dextrose 50% Injectable  dextrose 50% Injectable  glucagon  Injectable  (Floorstock) [completed]  (Floorstock) [completed]  (Floorstock) [completed]  (Floorstock) [completed]  (Floorstock) [completed]  BUpivacaine liposome 1.3% Injectable (no eMAR)  (Floorstock) [completed]  (Floorstock) [completed]  (Floorstock) [completed]  (Floorstock) [completed]  (Floorstock) [completed]  (Floorstock) [completed]  (Floorstock) [completed]  atorvastatin  zolpidem  ATENolol  Tablet  hydrochlorothiazide   Tablet  (Floorstock) [completed]  (ADM OVERRIDE) [completed]  HYDROmorphone  Injectable [completed]  HYDROmorphone  Injectable  HYDROmorphone PCA (1 mG/mL) [discontinued]  HYDROmorphone PCA (1 mG/mL) Rescue Clinician Bolus [discontinued]  naloxone Injectable  ondansetron Injectable  acetaminophen  IVPB. [completed]  pregabalin  (Floorstock) [completed]  (ADM OVERRIDE) [completed]  oxyCODONE  5 mG/acetaminophen 325 mG  HYDROmorphone  Injectable      ROS: Const:  ___febrile   Eyes:___ENT:___CV: _-__chest pain  Resp: _-___sob  GI:___nausea ___vomiting ____abd pain ___npo ___clears ___full diet __bm  :___ Musk: ___pain ___spasm  Skin:___ Neuro:  ___sedation___confusion____ numbness ___weakness ___paresthesia  Psych:___anxiety  Endo:___ Heme:___Allergy:___      04-08 @ 07:0891 mL/min/1.73M2          Hemoglobin: 10.8 g/dL (04-08 @ 07:08)  Hemoglobin: 13.0 g/dL (04-07 @ 11:54)        T(C): 36.8, Max: 37 (04-08 @ 05:09)  HR: 72 (49 - 73)  BP: 162/84 (125/71 - 177/86)  RR: 16 (15 - 18)  SpO2: 98% (95% - 99%)  Wt(kg): --     PHYSICAL EXAM:  Gen Appearance: x___no acute distress x___appropriate         Neuro: __x_SILT feet__x__ EOM Intact Psych: AAOX_3_, ___mood/affect appropriate        Eyes: _x__conjunctiva WNL  _____ Pupils equal and round        ENT: __x_ears and nose atraumatic___ Hearing grossly intact        Neck: _x__trachea midline, no visible masses ___thyroid without palpable mass    Resp: __x_Nml WOB____No tactile fremitus ___clear to auscultation    Cardio: __x_extremities free from edema ____pedal pulses palpable    GI/Abdomen: ___xsoft __x___ Nontender______Nondistended_____HSM    Lymphatic: ___xno palpable nodes in neck  ___no palpable nodes calves and feet    Skin/Wound: __x_Incision, x___Dressing c/d/i,   ____surrounding tissues soft,  ___drain/chest tube present____    Muscular: EHL _5__/5  Gastrocnemius_5__/5    ___absent clubbing/cyanosis         ASSESSMENT:  This is a 52y old Female with a history of:  M17.11  Unknown h/o HF  Family history of malignant neoplasm (Mother)  Family history of ischemic heart disease (Father)  Handoff  MEWS Score  Thyrotoxicosis  Hyperparathyroidism  Type 2 diabetes mellitus  Nontraumatic extradural hemorrhage  Essential hypertension  Primary osteoarthritis of right knee  Primary osteoarthritis of right knee  Thyrotoxicosis without thyroid storm, unspecified thyrotoxicosis type  Hypovitaminosis D  COPD, mild  Hyperparathyroidism  Type 2 diabetes mellitus without complication, without long-term current use of insulin  Essential hypertension  Primary osteoarthritis of right knee  Total knee arthroplasty  History of breast surgery  Other postprocedural status  History of total knee replacement        Recommended Treatment PLAN:  Chronic pain s/p TKR  Will d/c pca and change to oxycodone 10q3h   cont dilaudi 1.5 iv q2h prn resuce pain as pt is opioid toleratn w no signs of sedation.

## 2017-04-08 NOTE — PHYSICAL THERAPY INITIAL EVALUATION ADULT - PLANNED THERAPY INTERVENTIONS, PT EVAL
balance training/neuromuscular re-education/ROM/stretching/bed mobility training/transfer training/gait training/joint mobilization/postural re-education

## 2017-04-08 NOTE — PHYSICAL THERAPY INITIAL EVALUATION ADULT - IMPAIRMENTS FOUND, PT EVAL
joint integrity and mobility/muscle strength/gait, locomotion, and balance/aerobic capacity/endurance/ROM/integumentary integrity

## 2017-04-09 LAB
ANION GAP SERPL CALC-SCNC: 10 MMOL/L — SIGNIFICANT CHANGE UP (ref 9–16)
BUN SERPL-MCNC: 6 MG/DL — LOW (ref 7–23)
CALCIUM SERPL-MCNC: 11.1 MG/DL — HIGH (ref 8.5–10.5)
CHLORIDE SERPL-SCNC: 95 MMOL/L — LOW (ref 96–108)
CO2 SERPL-SCNC: 26 MMOL/L — SIGNIFICANT CHANGE UP (ref 22–31)
CREAT SERPL-MCNC: 0.72 MG/DL — SIGNIFICANT CHANGE UP (ref 0.5–1.3)
GLUCOSE SERPL-MCNC: 162 MG/DL — HIGH (ref 70–99)
HCT VFR BLD CALC: 34.3 % — LOW (ref 34.5–45)
HGB BLD-MCNC: 11.5 G/DL — SIGNIFICANT CHANGE UP (ref 11.5–15.5)
MCHC RBC-ENTMCNC: 27.3 PG — SIGNIFICANT CHANGE UP (ref 27–34)
MCHC RBC-ENTMCNC: 33.5 G/DL — SIGNIFICANT CHANGE UP (ref 32–36)
MCV RBC AUTO: 81.5 FL — SIGNIFICANT CHANGE UP (ref 80–100)
PLATELET # BLD AUTO: 389 K/UL — SIGNIFICANT CHANGE UP (ref 150–400)
POTASSIUM SERPL-MCNC: 3.3 MMOL/L — LOW (ref 3.5–5.3)
POTASSIUM SERPL-SCNC: 3.3 MMOL/L — LOW (ref 3.5–5.3)
RBC # BLD: 4.21 M/UL — SIGNIFICANT CHANGE UP (ref 3.8–5.2)
RBC # FLD: 13.7 % — SIGNIFICANT CHANGE UP (ref 10.3–16.9)
SODIUM SERPL-SCNC: 131 MMOL/L — LOW (ref 135–145)
WBC # BLD: 13.1 K/UL — HIGH (ref 3.8–10.5)
WBC # FLD AUTO: 13.1 K/UL — HIGH (ref 3.8–10.5)

## 2017-04-09 RX ORDER — POTASSIUM CHLORIDE 20 MEQ
40 PACKET (EA) ORAL ONCE
Qty: 0 | Refills: 0 | Status: COMPLETED | OUTPATIENT
Start: 2017-04-09 | End: 2017-04-09

## 2017-04-09 RX ADMIN — Medication 325 MILLIGRAM(S): at 12:11

## 2017-04-09 RX ADMIN — HYDROMORPHONE HYDROCHLORIDE 1.5 MILLIGRAM(S): 2 INJECTION INTRAMUSCULAR; INTRAVENOUS; SUBCUTANEOUS at 23:05

## 2017-04-09 RX ADMIN — HYDROMORPHONE HYDROCHLORIDE 1.5 MILLIGRAM(S): 2 INJECTION INTRAMUSCULAR; INTRAVENOUS; SUBCUTANEOUS at 13:42

## 2017-04-09 RX ADMIN — Medication 100 MILLIGRAM(S): at 05:41

## 2017-04-09 RX ADMIN — Medication 40 MILLIEQUIVALENT(S): at 09:27

## 2017-04-09 RX ADMIN — Medication 325 MILLIGRAM(S): at 05:42

## 2017-04-09 RX ADMIN — HYDROMORPHONE HYDROCHLORIDE 30 MILLILITER(S): 2 INJECTION INTRAMUSCULAR; INTRAVENOUS; SUBCUTANEOUS at 07:41

## 2017-04-09 RX ADMIN — Medication 1: at 12:38

## 2017-04-09 RX ADMIN — Medication 325 MILLIGRAM(S): at 17:34

## 2017-04-09 RX ADMIN — ATENOLOL 50 MILLIGRAM(S): 25 TABLET ORAL at 17:34

## 2017-04-09 RX ADMIN — POLYETHYLENE GLYCOL 3350 17 GRAM(S): 17 POWDER, FOR SOLUTION ORAL at 12:09

## 2017-04-09 RX ADMIN — Medication 1: at 17:33

## 2017-04-09 RX ADMIN — ATENOLOL 50 MILLIGRAM(S): 25 TABLET ORAL at 05:42

## 2017-04-09 RX ADMIN — Medication 25 MILLIGRAM(S): at 15:16

## 2017-04-09 RX ADMIN — Medication 100 MILLIGRAM(S): at 12:12

## 2017-04-09 RX ADMIN — Medication 25 MILLIGRAM(S): at 21:45

## 2017-04-09 RX ADMIN — ATORVASTATIN CALCIUM 20 MILLIGRAM(S): 80 TABLET, FILM COATED ORAL at 21:45

## 2017-04-09 RX ADMIN — HYDROMORPHONE HYDROCHLORIDE 1.5 MILLIGRAM(S): 2 INJECTION INTRAMUSCULAR; INTRAVENOUS; SUBCUTANEOUS at 11:38

## 2017-04-09 RX ADMIN — PANTOPRAZOLE SODIUM 40 MILLIGRAM(S): 20 TABLET, DELAYED RELEASE ORAL at 12:10

## 2017-04-09 RX ADMIN — HYDROMORPHONE HYDROCHLORIDE 1.5 MILLIGRAM(S): 2 INJECTION INTRAMUSCULAR; INTRAVENOUS; SUBCUTANEOUS at 01:30

## 2017-04-09 RX ADMIN — HYDROMORPHONE HYDROCHLORIDE 1.5 MILLIGRAM(S): 2 INJECTION INTRAMUSCULAR; INTRAVENOUS; SUBCUTANEOUS at 05:00

## 2017-04-09 RX ADMIN — HYDROMORPHONE HYDROCHLORIDE 1.5 MILLIGRAM(S): 2 INJECTION INTRAMUSCULAR; INTRAVENOUS; SUBCUTANEOUS at 07:17

## 2017-04-09 RX ADMIN — Medication 1 TABLET(S): at 12:12

## 2017-04-09 RX ADMIN — Medication 25 MILLIGRAM(S): at 05:42

## 2017-04-09 RX ADMIN — HYDROMORPHONE HYDROCHLORIDE 1.5 MILLIGRAM(S): 2 INJECTION INTRAMUSCULAR; INTRAVENOUS; SUBCUTANEOUS at 17:36

## 2017-04-09 RX ADMIN — HYDROMORPHONE HYDROCHLORIDE 1.5 MILLIGRAM(S): 2 INJECTION INTRAMUSCULAR; INTRAVENOUS; SUBCUTANEOUS at 09:28

## 2017-04-09 RX ADMIN — Medication 100 MILLIGRAM(S): at 21:45

## 2017-04-09 RX ADMIN — HYDROMORPHONE HYDROCHLORIDE 1.5 MILLIGRAM(S): 2 INJECTION INTRAMUSCULAR; INTRAVENOUS; SUBCUTANEOUS at 20:45

## 2017-04-09 NOTE — CONSULT NOTE ADULT - SUBJECTIVE AND OBJECTIVE BOX
Cardiology for Dee  pt seen and examined 4-9-2017  Patient is a 52y old  Female who presents with a chief complaint of right knee pain - consult for evaluation and management of htn (07 Apr 2017 13:58)      HPI:  53 yo male with chronic history of right knee pain in the presence of OA.  C/O : arthralgia, swelling, (07 Apr 2017 13:58)      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  Essential hypertension: Hypertension  History of breast surgery: bilateral- Feb.2016/ March 2016  Other postprocedural status: S/P spinal surgery  History of total knee replacement: S/P total knee replacement      HPI:                PREVIOUS DIAGNOSTIC TESTING:      ECHO  FINDINGS:    STRESS  FINDINGS:    CATHETERIZATION  FINDINGS:    MEDICATIONS  (STANDING):  lactated ringers. 1000milliLiter(s) IV Continuous <Continuous>  aspirin enteric coated 325milliGRAM(s) Oral two times a day  pantoprazole    Tablet 40milliGRAM(s) Oral daily  polyethylene glycol 3350 17Gram(s) Oral daily  docusate sodium 100milliGRAM(s) Oral three times a day  ferrous    sulfate 325milliGRAM(s) Oral three times a day with meals  multivitamin 1Tablet(s) Oral daily  insulin lispro (HumaLOG) corrective regimen sliding scale  SubCutaneous Before meals and at bedtime  dextrose 5%. 1000milliLiter(s) IV Continuous <Continuous>  dextrose 50% Injectable 12.5Gram(s) IV Push once  dextrose 50% Injectable 25Gram(s) IV Push once  dextrose 50% Injectable 25Gram(s) IV Push once  BUpivacaine liposome 1.3% Injectable (no eMAR) 20milliLiter(s) Local Injection once  atorvastatin 20milliGRAM(s) Oral at bedtime  ATENolol  Tablet 50milliGRAM(s) Oral two times a day  hydrochlorothiazide   Tablet 50milliGRAM(s) Oral daily  pregabalin 25milliGRAM(s) Oral three times a day  HYDROmorphone PCA (1 mG/mL) 30milliLiter(s) PCA Continuous PCA Continuous    MEDICATIONS  (PRN):  aluminum hydroxide/magnesium hydroxide/simethicone Suspension 30milliLiter(s) Oral four times a day PRN Indigestion  magnesium hydroxide Suspension 30milliLiter(s) Oral daily PRN Constipation  bisacodyl Suppository 10milliGRAM(s) Rectal daily PRN If no bowel movement by postoperative day #2  senna 2Tablet(s) Oral at bedtime PRN Constipation  dextrose Gel 1Dose(s) Oral once PRN Blood Glucose LESS THAN 70 milliGRAM(s)/deciliter  glucagon  Injectable 1milliGRAM(s) IntraMuscular once PRN Glucose LESS THAN 70 milligrams/deciliter  zolpidem 5milliGRAM(s) Oral at bedtime PRN Insomnia  naloxone Injectable 0.1milliGRAM(s) IV Push every 3 minutes PRN For ANY of the following changes in patient status:  A. RR LESS THAN 10 breaths per minute, B. Oxygen saturation LESS THAN 90%, C. Sedation score of 6  ondansetron Injectable 4milliGRAM(s) IV Push every 6 hours PRN Nausea  oxyCODONE  5 mG/acetaminophen 325 mG 2Tablet(s) Oral every 3 hours PRN Severe Pain (7 - 10)  HYDROmorphone PCA (1 mG/mL) Rescue Clinician Bolus 1.5milliGRAM(s) IV Push every 2 hours PRN for Pain Scale GREATER THAN 6  acetaminophen   Tablet 650milliGRAM(s) Oral every 6 hours PRN For Temp greater than 38 C (100.4 F)      FAMILY HISTORY:  Family history of malignant neoplasm (Mother): Family history of cancer  Family history of ischemic heart disease (Father): Family history of MI (myocardial infarction)      SOCIAL HISTORY:    CIGARETTES:    ALCOHOL:    REVIEW OF SYSTEMS      General:	    Skin/Breast:  	  Ophthalmologic:  	  ENMT:	    Respiratory and Thorax:  	  Cardiovascular:	    Gastrointestinal:	    Genitourinary:	    Musculoskeletal:	    Neurological:	    Psychiatric:	    Hematology/Lymphatics:	    Endocrine:	    Allergic/Immunologic:	    Vital Signs Last 24 Hrs  T(C): 37.6, Max: 37.9 (04-08 @ 23:21)  T(F): 99.6, Max: 100.2 (04-08 @ 23:21)  HR: 83 (67 - 94)  BP: 142/89 (142/89 - 178/94)  BP(mean): --  RR: 16 (15 - 18)  SpO2: 96% (95% - 97%)    PHYSICAL EXAM:      Constitutional:    Eyes:    ENMT:    Neck:    Breasts:    Back:    Respiratory:    Cardiovascular:    Gastrointestinal:    Genitourinary:    Rectal:    Extremities:    Vascular:    Neurological:    Skin:    Lymph Nodes:    Musculoskeletal:    Psychiatric:            INTERPRETATION OF TELEMETRY:    ECG:    I&O's Detail    I & Os for current day (as of 09 Apr 2017 23:01)  =============================================  IN:    Total IN: 0 ml  ---------------------------------------------  OUT:    Voided: 1400 ml    Total OUT: 1400 ml  ---------------------------------------------  Total NET: -1400 ml      LABS:                        11.5   13.1  )-----------( 389      ( 09 Apr 2017 06:36 )             34.3     04-09    131<L>  |  95<L>  |  6<L>  ----------------------------<  162<H>  3.3<L>   |  26  |  0.72    Ca    11.1<H>      09 Apr 2017 06:36              I&O's Summary    I & Os for current day (as of 09 Apr 2017 23:01)  =============================================  IN: 0 ml / OUT: 1400 ml / NET: -1400 ml    BNP  RADIOLOGY & ADDITIONAL STUDIES: Cardiology for Luiz  pt seen and examined 2017; I was informed of consult on 2017  Patient is a 52y old  Female who presents with a chief complaint of right knee pain - consult for evaluation and management of htn (2017 13:58)    53 yo male with chronic history of right knee pain in the presence of OA.  Denies recent chest pain, acute dyspnea, palpitations, dizziness, lightheadedness, near-syncope, or sycnope.  Patient reports prior bp elevation requiring an unknown third anti-hypertensive medication in the past which was eventually discontineud and she was maintained on atenolol and hctz.  No acute cardiac complaints now.    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  Essential hypertension: Hypertension  History of breast surgery: bilateral- / 2016  Other postprocedural status: S/P spinal surgery  History of total knee replacement: S/P total knee replacement    MEDICATIONS  (STANDING):  lactated ringers. 1000milliLiter(s) IV Continuous <Continuous>  aspirin enteric coated 325milliGRAM(s) Oral two times a day  pantoprazole    Tablet 40milliGRAM(s) Oral daily  polyethylene glycol 3350 17Gram(s) Oral daily  docusate sodium 100milliGRAM(s) Oral three times a day  ferrous    sulfate 325milliGRAM(s) Oral three times a day with meals  multivitamin 1Tablet(s) Oral daily  insulin lispro (HumaLOG) corrective regimen sliding scale  SubCutaneous Before meals and at bedtime  dextrose 5%. 1000milliLiter(s) IV Continuous <Continuous>  dextrose 50% Injectable 12.5Gram(s) IV Push once  dextrose 50% Injectable 25Gram(s) IV Push once  dextrose 50% Injectable 25Gram(s) IV Push once  BUpivacaine liposome 1.3% Injectable (no eMAR) 20milliLiter(s) Local Injection once  atorvastatin 20milliGRAM(s) Oral at bedtime  ATENolol  Tablet 50milliGRAM(s) Oral two times a day  hydrochlorothiazide   Tablet 50milliGRAM(s) Oral daily  pregabalin 25milliGRAM(s) Oral three times a day  HYDROmorphone PCA (1 mG/mL) 30milliLiter(s) PCA Continuous PCA Continuous    MEDICATIONS  (PRN):  aluminum hydroxide/magnesium hydroxide/simethicone Suspension 30milliLiter(s) Oral four times a day PRN Indigestion  magnesium hydroxide Suspension 30milliLiter(s) Oral daily PRN Constipation  bisacodyl Suppository 10milliGRAM(s) Rectal daily PRN If no bowel movement by postoperative day #2  senna 2Tablet(s) Oral at bedtime PRN Constipation  dextrose Gel 1Dose(s) Oral once PRN Blood Glucose LESS THAN 70 milliGRAM(s)/deciliter  glucagon  Injectable 1milliGRAM(s) IntraMuscular once PRN Glucose LESS THAN 70 milligrams/deciliter  zolpidem 5milliGRAM(s) Oral at bedtime PRN Insomnia  naloxone Injectable 0.1milliGRAM(s) IV Push every 3 minutes PRN For ANY of the following changes in patient status:  A. RR LESS THAN 10 breaths per minute, B. Oxygen saturation LESS THAN 90%, C. Sedation score of 6  ondansetron Injectable 4milliGRAM(s) IV Push every 6 hours PRN Nausea  oxyCODONE  5 mG/acetaminophen 325 mG 2Tablet(s) Oral every 3 hours PRN Severe Pain (7 - 10)  HYDROmorphone PCA (1 mG/mL) Rescue Clinician Bolus 1.5milliGRAM(s) IV Push every 2 hours PRN for Pain Scale GREATER THAN 6  acetaminophen   Tablet 650milliGRAM(s) Oral every 6 hours PRN For Temp greater than 38 C (100.4 F)    FAMILY HISTORY:  Family history of malignant neoplasm (Mother): Family history of cancer  Family history of ischemic heart disease (Father): Family history of MI (myocardial infarction)  brother  of mi       SOCIAL HISTORY: no recent tobacco, drug, or etoh use per patient    REVIEW OF SYSTEMS  General: no fever/chills    Ophthalmologic:no vision change now  	  ENMT:	no nasal congestion    Respiratory and Thorax: no acute dyspnea  	  Cardiovascular:	denied cp and palp    Gastrointestinal:	no abd pain    Genitourinary:	no dysuria    Musculoskeletal: post-op right knee	    Neurological:	no focal weakness    Psychiatric:	appropriate    Hematology/Lymphatics:no severe anemia	    Endocrine:	pt is dm (dm-2)    Allergic/Immunologic:	nkda    Vital Signs Last 24 Hrs  T(C): 37.6, Max: 37.9 (04-08 @ 23:21)  T(F): 99.6, Max: 100.2 ( @ 23:21)  HR: 83 (67 - 94)  BP: 142/89 (142/89 - 178/94)  BP(mean): --  RR: 16 (15 - 18)  SpO2: 96% (95% - 97%)    PHYSICAL EXAM:  Constitutional:nad; supine    Eyes:anicteric    ENMT:mmm    Neck:no carotid bruit    Respiratory:clear lung fields    Cardiovascular:rr; s1/s2 present    Gastrointestinal:soft abd; nt    Extremities:le pulses present bilat    Neurological:conversant    Skin:warm    Musculoskeletal:dressing at right knee    Psychiatric:appropriate    INTERPRETATION OF TELEMETRY:not on tele floor    ECG:3- ecg reviewed on 2017    I&O's Detail    I & Os for current day (as of 2017 23:01)  =============================================  IN:    Total IN: 0 ml  ---------------------------------------------  OUT:    Voided: 1400 ml    Total OUT: 1400 ml  ---------------------------------------------  Total NET: -1400 ml      LABS:                        11.5   13.1  )-----------( 389      ( 2017 06:36 )             34.3     04-    131<L>  |  95<L>  |  6<L>  ----------------------------<  162<H>  3.3<L>   |  26  |  0.72    Ca    11.1<H>      2017 06:36              I&O's Summary    I & Os for current day (as of 2017 23:01)  =============================================  IN: 0 ml / OUT: 1400 ml / NET: -1400 ml    BNP  RADIOLOGY & ADDITIONAL STUDIES:    3- cxr report reviewed 2017 Cardiology for Luiz  pt seen and examined 2017; I was informed of consult on 2017  Patient is a 52y old  Female who presents with a chief complaint of right knee pain - consult for evaluation and management of htn (2017 13:58)    53 yo male with chronic history of right knee pain in the presence of OA.  Denies recent chest pain, acute dyspnea, palpitations, dizziness, lightheadedness, near-syncope, or sycnope.  Patient reports prior bp elevation requiring an unknown third anti-hypertensive medication in the past which was eventually discontineud and she was maintained on atenolol and hctz.  No acute cardiac complaints now.    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  Essential hypertension: Hypertension  History of breast surgery: bilateral- / 2016  Other postprocedural status: S/P spinal surgery  History of total knee replacement: S/P total knee replacement    MEDICATIONS  (STANDING):  lactated ringers. 1000milliLiter(s) IV Continuous <Continuous>  aspirin enteric coated 325milliGRAM(s) Oral two times a day  pantoprazole    Tablet 40milliGRAM(s) Oral daily  polyethylene glycol 3350 17Gram(s) Oral daily  docusate sodium 100milliGRAM(s) Oral three times a day  ferrous    sulfate 325milliGRAM(s) Oral three times a day with meals  multivitamin 1Tablet(s) Oral daily  insulin lispro (HumaLOG) corrective regimen sliding scale  SubCutaneous Before meals and at bedtime  dextrose 5%. 1000milliLiter(s) IV Continuous <Continuous>  dextrose 50% Injectable 12.5Gram(s) IV Push once  dextrose 50% Injectable 25Gram(s) IV Push once  dextrose 50% Injectable 25Gram(s) IV Push once  BUpivacaine liposome 1.3% Injectable (no eMAR) 20milliLiter(s) Local Injection once  atorvastatin 20milliGRAM(s) Oral at bedtime  ATENolol  Tablet 50milliGRAM(s) Oral two times a day  hydrochlorothiazide   Tablet 50milliGRAM(s) Oral daily  pregabalin 25milliGRAM(s) Oral three times a day  HYDROmorphone PCA (1 mG/mL) 30milliLiter(s) PCA Continuous PCA Continuous    MEDICATIONS  (PRN):  aluminum hydroxide/magnesium hydroxide/simethicone Suspension 30milliLiter(s) Oral four times a day PRN Indigestion  magnesium hydroxide Suspension 30milliLiter(s) Oral daily PRN Constipation  bisacodyl Suppository 10milliGRAM(s) Rectal daily PRN If no bowel movement by postoperative day #2  senna 2Tablet(s) Oral at bedtime PRN Constipation  dextrose Gel 1Dose(s) Oral once PRN Blood Glucose LESS THAN 70 milliGRAM(s)/deciliter  glucagon  Injectable 1milliGRAM(s) IntraMuscular once PRN Glucose LESS THAN 70 milligrams/deciliter  zolpidem 5milliGRAM(s) Oral at bedtime PRN Insomnia  naloxone Injectable 0.1milliGRAM(s) IV Push every 3 minutes PRN For ANY of the following changes in patient status:  A. RR LESS THAN 10 breaths per minute, B. Oxygen saturation LESS THAN 90%, C. Sedation score of 6  ondansetron Injectable 4milliGRAM(s) IV Push every 6 hours PRN Nausea  oxyCODONE  5 mG/acetaminophen 325 mG 2Tablet(s) Oral every 3 hours PRN Severe Pain (7 - 10)  HYDROmorphone PCA (1 mG/mL) Rescue Clinician Bolus 1.5milliGRAM(s) IV Push every 2 hours PRN for Pain Scale GREATER THAN 6  acetaminophen   Tablet 650milliGRAM(s) Oral every 6 hours PRN For Temp greater than 38 C (100.4 F)    FAMILY HISTORY:  Family history of malignant neoplasm (Mother): Family history of cancer  Family history of ischemic heart disease (Father): Family history of MI (myocardial infarction)  brother  of mi       SOCIAL HISTORY: no recent tobacco, drug, or etoh use per patient; per pre-op forms - quit tobacco , occ etoh    REVIEW OF SYSTEMS  General: no fever/chills    Ophthalmologic:no vision change now  	  ENMT:	no nasal congestion    Respiratory and Thorax: no acute dyspnea  	  Cardiovascular:	denied cp and palp    Gastrointestinal:	no abd pain    Genitourinary:	no dysuria    Musculoskeletal: post-op right knee	    Neurological:	no focal weakness    Psychiatric:	appropriate    Hematology/Lymphatics:no severe anemia	    Endocrine:	pt is dm (dm-2)    Allergic/Immunologic:	nkda    Vital Signs Last 24 Hrs  T(C): 37.6, Max: 37.9 ( @ 23:21)  T(F): 99.6, Max: 100.2 ( @ 23:21)  HR: 83 (67 - 94)  BP: 142/89 (142/89 - 178/94)  BP(mean): --  RR: 16 (15 - 18)  SpO2: 96% (95% - 97%)    PHYSICAL EXAM:  Constitutional:nad; supine    Eyes:anicteric    ENMT:mmm    Neck:no carotid bruit    Respiratory:clear lung fields    Cardiovascular:rr; s1/s2 present    Gastrointestinal:soft abd; nt    Extremities:le pulses present bilat    Neurological:conversant    Skin:warm    Musculoskeletal:dressing at right knee    Psychiatric:appropriate    INTERPRETATION OF TELEMETRY:not on tele floor    ECG:3- ecg reviewed on 2017    I&O's Detail    I & Os for current day (as of 2017 23:01)  =============================================  IN:    Total IN: 0 ml  ---------------------------------------------  OUT:    Voided: 1400 ml    Total OUT: 1400 ml  ---------------------------------------------  Total NET: -1400 ml      LABS:                        11.5   13.1  )-----------( 389      ( 2017 06:36 )             34.3     -    131<L>  |  95<L>  |  6<L>  ----------------------------<  162<H>  3.3<L>   |  26  |  0.72    Ca    11.1<H>      2017 06:36              I&O's Summary    I & Os for current day (as of 2017 23:01)  =============================================  IN: 0 ml / OUT: 1400 ml / NET: -1400 ml    BNP  RADIOLOGY & ADDITIONAL STUDIES:    3- cxr report reviewed 2017

## 2017-04-09 NOTE — PROGRESS NOTE ADULT - SUBJECTIVE AND OBJECTIVE BOX
S: Patient pain moderately controlled with adjustments to regimen yesterday.  Hasn't had much of an appetite but hasn't had nausea.  Has eat soft foods and keeping them down.  Ambulated in room again with PT yesterday.  Urinating well, passed flatus.  Denies F/C/N/V/SOB/CP.    O:  -200's/90's yesterday, Tmax 100.8, Tcurrent 98.8, VSS otherwise  Dressing c/d/i with ice bags in place  EHL/FHL 5/5  SILT in sural/saphenous/peroneals/tibial n  Pedal pulses 2+/4  Calves supple, NTTP

## 2017-04-09 NOTE — CONSULT NOTE ADULT - ASSESSMENT
1) htn - lability noted - continue rx as tolerated; consider additional rx if lability continues  -avoid additional lability  -check ecg, echo, and tsh  -i/o and daily weights  2) hyperthyroid/hyper-pth/dm-2 - monitor lytes; check thyroid studies - consider endo if indicated; [facility] hba1c  3) post-op ortho surgery per primary team  4) maintain K>4, Mg>2  Dr. Dee to return 4- 1) htn - lability noted - continue rx as tolerated; consider additional rx if lability continues  -avoid additional lability  -check ecg, echo, and tsh  -i/o and daily weights  -consider ace-i versus arb if no contraindications  -check lipid panel and lft - consider statin if no contraindications  2) hyperthyroid/hyper-pth/dm-2 - monitor lytes; check thyroid studies - consider endo if indicated; [facility] hba1c  3) post-op ortho surgery per primary team  4) maintain K>4, Mg>2  Dr. Dee to return 4-

## 2017-04-10 ENCOUNTER — TRANSCRIPTION ENCOUNTER (OUTPATIENT)
Age: 52
End: 2017-04-10

## 2017-04-10 LAB
ANION GAP SERPL CALC-SCNC: 9 MMOL/L — SIGNIFICANT CHANGE UP (ref 9–16)
BUN SERPL-MCNC: 12 MG/DL — SIGNIFICANT CHANGE UP (ref 7–23)
CALCIUM SERPL-MCNC: 11.1 MG/DL — HIGH (ref 8.5–10.5)
CHLORIDE SERPL-SCNC: 94 MMOL/L — LOW (ref 96–108)
CO2 SERPL-SCNC: 29 MMOL/L — SIGNIFICANT CHANGE UP (ref 22–31)
CREAT SERPL-MCNC: 0.79 MG/DL — SIGNIFICANT CHANGE UP (ref 0.5–1.3)
GLUCOSE SERPL-MCNC: 167 MG/DL — HIGH (ref 70–99)
HCT VFR BLD CALC: 34 % — LOW (ref 34.5–45)
HGB BLD-MCNC: 11.6 G/DL — SIGNIFICANT CHANGE UP (ref 11.5–15.5)
MCHC RBC-ENTMCNC: 27.6 PG — SIGNIFICANT CHANGE UP (ref 27–34)
MCHC RBC-ENTMCNC: 34.1 G/DL — SIGNIFICANT CHANGE UP (ref 32–36)
MCV RBC AUTO: 81 FL — SIGNIFICANT CHANGE UP (ref 80–100)
PLATELET # BLD AUTO: 405 K/UL — HIGH (ref 150–400)
POTASSIUM SERPL-MCNC: 3.5 MMOL/L — SIGNIFICANT CHANGE UP (ref 3.5–5.3)
POTASSIUM SERPL-SCNC: 3.5 MMOL/L — SIGNIFICANT CHANGE UP (ref 3.5–5.3)
RBC # BLD: 4.2 M/UL — SIGNIFICANT CHANGE UP (ref 3.8–5.2)
RBC # FLD: 13.7 % — SIGNIFICANT CHANGE UP (ref 10.3–16.9)
SODIUM SERPL-SCNC: 132 MMOL/L — LOW (ref 135–145)
WBC # BLD: 12.4 K/UL — HIGH (ref 3.8–10.5)
WBC # FLD AUTO: 12.4 K/UL — HIGH (ref 3.8–10.5)

## 2017-04-10 RX ORDER — POLYETHYLENE GLYCOL 3350 17 G/17G
17 POWDER, FOR SOLUTION ORAL
Qty: 0 | Refills: 0 | COMMUNITY
Start: 2017-04-10

## 2017-04-10 RX ORDER — HYDROMORPHONE HYDROCHLORIDE 2 MG/ML
4 INJECTION INTRAMUSCULAR; INTRAVENOUS; SUBCUTANEOUS EVERY 4 HOURS
Qty: 0 | Refills: 0 | Status: DISCONTINUED | OUTPATIENT
Start: 2017-04-10 | End: 2017-04-13

## 2017-04-10 RX ORDER — DOCUSATE SODIUM 100 MG
1 CAPSULE ORAL
Qty: 0 | Refills: 0 | COMMUNITY
Start: 2017-04-10

## 2017-04-10 RX ORDER — ZOLPIDEM TARTRATE 10 MG/1
1 TABLET ORAL
Qty: 0 | Refills: 0 | COMMUNITY

## 2017-04-10 RX ORDER — KETOROLAC TROMETHAMINE 30 MG/ML
15 SYRINGE (ML) INJECTION EVERY 6 HOURS
Qty: 0 | Refills: 0 | Status: DISCONTINUED | OUTPATIENT
Start: 2017-04-10 | End: 2017-04-11

## 2017-04-10 RX ORDER — POTASSIUM CHLORIDE 20 MEQ
20 PACKET (EA) ORAL ONCE
Qty: 0 | Refills: 0 | Status: COMPLETED | OUTPATIENT
Start: 2017-04-10 | End: 2017-04-10

## 2017-04-10 RX ORDER — HYDROMORPHONE HYDROCHLORIDE 2 MG/ML
1 INJECTION INTRAMUSCULAR; INTRAVENOUS; SUBCUTANEOUS EVERY 4 HOURS
Qty: 0 | Refills: 0 | Status: DISCONTINUED | OUTPATIENT
Start: 2017-04-10 | End: 2017-04-11

## 2017-04-10 RX ORDER — SENNA PLUS 8.6 MG/1
2 TABLET ORAL
Qty: 0 | Refills: 0 | COMMUNITY
Start: 2017-04-10

## 2017-04-10 RX ORDER — OXYCODONE HYDROCHLORIDE 5 MG/1
10 TABLET ORAL EVERY 12 HOURS
Qty: 0 | Refills: 0 | Status: DISCONTINUED | OUTPATIENT
Start: 2017-04-10 | End: 2017-04-11

## 2017-04-10 RX ORDER — ASPIRIN/CALCIUM CARB/MAGNESIUM 324 MG
1 TABLET ORAL
Qty: 0 | Refills: 0 | COMMUNITY
Start: 2017-04-10

## 2017-04-10 RX ADMIN — OXYCODONE HYDROCHLORIDE 10 MILLIGRAM(S): 5 TABLET ORAL at 17:44

## 2017-04-10 RX ADMIN — Medication 25 MILLIGRAM(S): at 05:54

## 2017-04-10 RX ADMIN — Medication 1: at 22:40

## 2017-04-10 RX ADMIN — Medication 25 MILLIGRAM(S): at 12:53

## 2017-04-10 RX ADMIN — HYDROMORPHONE HYDROCHLORIDE 1.5 MILLIGRAM(S): 2 INJECTION INTRAMUSCULAR; INTRAVENOUS; SUBCUTANEOUS at 06:40

## 2017-04-10 RX ADMIN — ATORVASTATIN CALCIUM 20 MILLIGRAM(S): 80 TABLET, FILM COATED ORAL at 21:40

## 2017-04-10 RX ADMIN — Medication 25 MILLIGRAM(S): at 21:40

## 2017-04-10 RX ADMIN — HYDROMORPHONE HYDROCHLORIDE 4 MILLIGRAM(S): 2 INJECTION INTRAMUSCULAR; INTRAVENOUS; SUBCUTANEOUS at 12:54

## 2017-04-10 RX ADMIN — Medication 20 MILLIEQUIVALENT(S): at 07:45

## 2017-04-10 RX ADMIN — Medication 15 MILLIGRAM(S): at 17:44

## 2017-04-10 RX ADMIN — HYDROMORPHONE HYDROCHLORIDE 4 MILLIGRAM(S): 2 INJECTION INTRAMUSCULAR; INTRAVENOUS; SUBCUTANEOUS at 21:30

## 2017-04-10 RX ADMIN — Medication 1 TABLET(S): at 12:54

## 2017-04-10 RX ADMIN — Medication 15 MILLIGRAM(S): at 13:30

## 2017-04-10 RX ADMIN — ATENOLOL 50 MILLIGRAM(S): 25 TABLET ORAL at 17:47

## 2017-04-10 RX ADMIN — Medication 15 MILLIGRAM(S): at 12:53

## 2017-04-10 RX ADMIN — HYDROMORPHONE HYDROCHLORIDE 4 MILLIGRAM(S): 2 INJECTION INTRAMUSCULAR; INTRAVENOUS; SUBCUTANEOUS at 22:30

## 2017-04-10 RX ADMIN — PANTOPRAZOLE SODIUM 40 MILLIGRAM(S): 20 TABLET, DELAYED RELEASE ORAL at 12:53

## 2017-04-10 RX ADMIN — Medication 100 MILLIGRAM(S): at 21:40

## 2017-04-10 RX ADMIN — Medication 325 MILLIGRAM(S): at 05:54

## 2017-04-10 RX ADMIN — Medication: at 06:46

## 2017-04-10 RX ADMIN — POLYETHYLENE GLYCOL 3350 17 GRAM(S): 17 POWDER, FOR SOLUTION ORAL at 12:54

## 2017-04-10 RX ADMIN — Medication 100 MILLIGRAM(S): at 05:54

## 2017-04-10 RX ADMIN — Medication 325 MILLIGRAM(S): at 17:44

## 2017-04-10 RX ADMIN — HYDROMORPHONE HYDROCHLORIDE 4 MILLIGRAM(S): 2 INJECTION INTRAMUSCULAR; INTRAVENOUS; SUBCUTANEOUS at 13:30

## 2017-04-10 RX ADMIN — Medication 15 MILLIGRAM(S): at 17:53

## 2017-04-10 RX ADMIN — HYDROMORPHONE HYDROCHLORIDE 4 MILLIGRAM(S): 2 INJECTION INTRAMUSCULAR; INTRAVENOUS; SUBCUTANEOUS at 17:40

## 2017-04-10 RX ADMIN — ATENOLOL 50 MILLIGRAM(S): 25 TABLET ORAL at 05:54

## 2017-04-10 RX ADMIN — Medication 325 MILLIGRAM(S): at 12:53

## 2017-04-10 RX ADMIN — HYDROMORPHONE HYDROCHLORIDE 4 MILLIGRAM(S): 2 INJECTION INTRAMUSCULAR; INTRAVENOUS; SUBCUTANEOUS at 16:50

## 2017-04-10 RX ADMIN — HYDROMORPHONE HYDROCHLORIDE 1.5 MILLIGRAM(S): 2 INJECTION INTRAMUSCULAR; INTRAVENOUS; SUBCUTANEOUS at 03:30

## 2017-04-10 RX ADMIN — Medication 100 MILLIGRAM(S): at 12:53

## 2017-04-10 NOTE — DISCHARGE NOTE ADULT - PATIENT PORTAL LINK FT
“You can access the [facility] Patient Portal, offered by [facility], by registering with the following website: [email or web address]”

## 2017-04-10 NOTE — DISCHARGE NOTE ADULT - CARE PLAN
Principal Discharge DX:	Primary osteoarthritis of right knee  Goal:	Improved ambulation and decreased pain after right total knee replacement 4/7/17  Instructions for follow-up, activity and diet:	Weight bearing as tolerated on right leg with assistive device.  No strenuous activity, heavy lifting, driving, tub bathing, or returning to work until cleared by MD.  You may shower--dressing is waterproof.  Remove dressing after post op day 7, then leave incision open to air.  Follow up with Dr. Shin to schedule an appt within 10-14 days.  If you don't have a bowel movement by post op day 3, then take Milk of Magnesia (over the counter).  If no bowel movement by at least post op day 5, then use a Dulcolax suppository (over the counter) and/or a Fleets enema--if still no bowel movement, call your MD.  Contact your doctor if you experience: fever greater than 101.5, chills, chest pain, difficulty breathing, bleeding, redness or heat around the incision. Principal Discharge DX:	Primary osteoarthritis of right knee  Goal:	Improved ambulation and decreased pain after right total knee replacement 4/7/17  Instructions for follow-up, activity and diet:	Weight bearing as tolerated on right leg with assistive device.  No strenuous activity, heavy lifting, driving, tub bathing, or returning to work until cleared by MD.  You may shower--dressing is waterproof.  Remove dressing after post op day 7, then leave incision open to air.  Follow up with Dr. Shin to schedule an appt within 10-14 days.  If you don't have a bowel movement by post op day 3, then take Milk of Magnesia (over the counter).  If no bowel movement by at least post op day 5, then use a Dulcolax suppository (over the counter) and/or a Fleets enema--if still no bowel movement, call your MD.  Contact your doctor if you experience: fever greater than 101.5, chills, chest pain, difficulty breathing, bleeding, redness or heat around the incision.  **Patient must have a bowel movement on 4/13/17.  If no BM after oral regimen, increase to rectal suppository or enema.** Principal Discharge DX:	Primary osteoarthritis of right knee  Goal:	Improved ambulation and decreased pain after right total knee replacement 4/7/17  Instructions for follow-up, activity and diet:	Weight bearing as tolerated on right leg with assistive device and assistive device.  No strenuous activity, heavy lifting, driving, tub bathing, or returning to work until cleared by MD.  You may shower--dressing is waterproof.  Remove dressing after post op day 7, then leave incision open to air.  Follow up with Dr. Shin to schedule an appt within 10-14 days.  If you don't have a bowel movement by post op day 3, then take Milk of Magnesia (over the counter).  If no bowel movement by at least post op day 5, then use a Dulcolax suppository (over the counter) and/or a Fleets enema--if still no bowel movement, call your MD.  Contact your doctor if you experience: fever greater than 101.5, chills, chest pain, difficulty breathing, bleeding, redness or heat around the incision.  **Patient must have a bowel movement on 4/13/17.  If no BM after oral regimen, increase to rectal suppository or enema.**

## 2017-04-10 NOTE — DISCHARGE NOTE ADULT - PLAN OF CARE
Improved ambulation and decreased pain after right total knee replacement 4/7/17 Weight bearing as tolerated on right leg with assistive device.  No strenuous activity, heavy lifting, driving, tub bathing, or returning to work until cleared by MD.  You may shower--dressing is waterproof.  Remove dressing after post op day 7, then leave incision open to air.  Follow up with Dr. Shin to schedule an appt within 10-14 days.  If you don't have a bowel movement by post op day 3, then take Milk of Magnesia (over the counter).  If no bowel movement by at least post op day 5, then use a Dulcolax suppository (over the counter) and/or a Fleets enema--if still no bowel movement, call your MD.  Contact your doctor if you experience: fever greater than 101.5, chills, chest pain, difficulty breathing, bleeding, redness or heat around the incision. Weight bearing as tolerated on right leg with assistive device.  No strenuous activity, heavy lifting, driving, tub bathing, or returning to work until cleared by MD.  You may shower--dressing is waterproof.  Remove dressing after post op day 7, then leave incision open to air.  Follow up with Dr. Shin to schedule an appt within 10-14 days.  If you don't have a bowel movement by post op day 3, then take Milk of Magnesia (over the counter).  If no bowel movement by at least post op day 5, then use a Dulcolax suppository (over the counter) and/or a Fleets enema--if still no bowel movement, call your MD.  Contact your doctor if you experience: fever greater than 101.5, chills, chest pain, difficulty breathing, bleeding, redness or heat around the incision.  **Patient must have a bowel movement on 4/13/17.  If no BM after oral regimen, increase to rectal suppository or enema.** Weight bearing as tolerated on right leg with assistive device and assistive device.  No strenuous activity, heavy lifting, driving, tub bathing, or returning to work until cleared by MD.  You may shower--dressing is waterproof.  Remove dressing after post op day 7, then leave incision open to air.  Follow up with Dr. Shin to schedule an appt within 10-14 days.  If you don't have a bowel movement by post op day 3, then take Milk of Magnesia (over the counter).  If no bowel movement by at least post op day 5, then use a Dulcolax suppository (over the counter) and/or a Fleets enema--if still no bowel movement, call your MD.  Contact your doctor if you experience: fever greater than 101.5, chills, chest pain, difficulty breathing, bleeding, redness or heat around the incision.  **Patient must have a bowel movement on 4/13/17.  If no BM after oral regimen, increase to rectal suppository or enema.**

## 2017-04-10 NOTE — DISCHARGE NOTE ADULT - CARE PROVIDERS DIRECT ADDRESSES
[email or web address]

## 2017-04-10 NOTE — PROGRESS NOTE ADULT - SUBJECTIVE AND OBJECTIVE BOX
ORTHO NOTE    [x ] Pt seen/examined.  [ ] Pt without any complaints/in NAD.    [x ] Pt complains of: right knee incisional pain      ROS: [ ] Fever  [ ] Chills  [ ] CP [ ] SOB [ ] Dysnea  [ ] Palpitations [ ] Cough [ ] N/V/C/D [ ] Paresthia [ ] Other     [x ] ROS  otherwise negative    .    PHYSICAL EXAM:    Vital Signs Last 24 Hrs  T(C): 37.1, Max: 37.6 (04-09 @ 21:15)  T(F): 98.7, Max: 99.6 (04-09 @ 21:15)  HR: 74 (74 - 83)  BP: 135/84 (135/84 - 165/93)  BP(mean): --  RR: 16 (16 - 17)  SpO2: 95% (95% - 96%)    I&O's Detail  I & Os for 24h ending 10 Apr 2017 07:00  =============================================  IN:    Oral Fluid: 480 ml    lactated ringers.: 330 ml    Total IN: 810 ml  ---------------------------------------------  OUT:    Voided: 1100 ml    Total OUT: 1100 ml  ---------------------------------------------  Total NET: -290 ml    I & Os for current day (as of 10 Apr 2017 14:18)  =============================================  IN:    Oral Fluid: 720 ml    Total IN: 720 ml  ---------------------------------------------  OUT:    Voided: 1350 ml    Total OUT: 1350 ml  ---------------------------------------------  Total NET: -630 ml       CAPILLARY BLOOD GLUCOSE  155 (10 Apr 2017 11:12)  159 (10 Apr 2017 06:40)  144 (09 Apr 2017 22:01)  187 (09 Apr 2017 16:15)                  Neuro: AAOX3    Lungs: CTA, IS demonstrated    CV:    ABD: soft, nontender, bowel regimen    Ext: right knee aquacell cdi, right LE NVID    LABS                        11.6   12.4  )-----------( 405      ( 10 Apr 2017 06:24 )             34.0                                04-10    132<L>  |  94<L>  |  12  ----------------------------<  167<H>  3.5   |  29  |  0.79    Ca    11.1<H>      10 Apr 2017 06:24        [ ] Other Labs  [ ] None ordered            Please check or [place] when present:  •  Heart Failure:    [ ] Acute        [ ]  Acute on Chronic        [ ] Chronic         [ ] Diastolic     [ ]  Combined    •  LIVIA:     [ ] ATN        [ ]  Renal medullary necrosis       [ ]  Renal cortical necrosis                  [ ] Other pathological Lesion:  •  CKD:  [ ] Stage I   [ ] Stage II  [ ] Stage III    [ ]Stage IV   [ ]  CKD V   [ ]  Other/Unspecified:    •  Abdominal Nutritional Status:   [ ] Malnutrition-See Nutrition note    [ ] Cachexia   [ ]  Other        [ ] Supplement ordered:            [ ] Morbid Obesity: BMI >=40         ASSESSMENT/PLAN:      STATUS POST: R TKA pod3  Pain management transitioned off PCA to oral regimen and toradol  CONTINUE:          [ ] PT- WBAT    [ ] DVT PPX- scd boots, ASA bid     [ ] Pain Mgt- PO meds, IV toradol    [ ] Dispo plan- VIC

## 2017-04-10 NOTE — DISCHARGE NOTE ADULT - MEDICATION SUMMARY - MEDICATIONS TO STOP TAKING
I will STOP taking the medications listed below when I get home from the hospital:    methadone 10 mg oral tablet  --  by mouth

## 2017-04-10 NOTE — DISCHARGE NOTE ADULT - MEDICATION SUMMARY - MEDICATIONS TO TAKE
I will START or STAY ON the medications listed below when I get home from the hospital:    aspirin 325 mg oral delayed release tablet  -- 1 tab(s) by mouth 2 times a day  Take for 4 weeks after surgery to prevent blood clots  -- Indication: For Prevent blood clots    acetaminophen 325 mg oral tablet  -- 2 tab(s) by mouth every 6 hours, As needed, For Temp greater than 38 C (100.4 F)  -- Indication: For fever    HYDROmorphone 2 mg oral tablet  -- 3 tab(s) by mouth every 4 hours, As needed, Severe Pain (7 - 10)  -- Indication: For severe pain    HYDROmorphone 4 mg oral tablet  -- 1 tab(s) by mouth every 4 hours, As needed, Moderate Pain (4 - 6)  -- Indication: For Moderate pain    ibuprofen 600 mg oral tablet  -- 1 tab(s) by mouth every 6 hours  -- Indication: For Antiiflammatory/pain control    oxyCODONE 10 mg oral tablet, extended release  -- 1 tab(s) by mouth every 8 hours  -- Indication: For long acting pain control    pregabalin 50 mg oral capsule  -- 1 cap(s) by mouth 2 times a day  -- Indication: For nerve pain    insulin lispro 100 units/mL subcutaneous solution  --  subcutaneous 4 times a day (before meals and at bedtime); 1 Unit(s) if Glucose 151 - 200  2 Unit(s) if Glucose 201 - 250  3 Unit(s) if Glucose 251 - 300  4 Unit(s) if Glucose 301 - 350  5 Unit(s) if Glucose 351 - 400  6 Unit(s) if Glucose Greater Than 400  -- Indication: For Type 2 diabetes mellitus without complication, without long-term current use of insulin    atorvastatin 20 mg oral tablet  -- 1 tab(s) by mouth once a day  -- Indication: For Hypercholesterolemia    zolpidem 10 mg oral tablet  -- 1 tab(s) by mouth once a day (at bedtime), as needed, severe insomnia  -- Indication: For severe insomnia    atenolol 50 mg oral tablet  -- 50 milligram(s) by mouth 2 times a day  -- Indication: For beta blocker    hydroCHLOROthiazide 50 mg oral tablet  -- 1 tab(s) by mouth once a day  -- Indication: For Home diuretic    bisacodyl 10 mg rectal suppository  -- 1 suppository(ies) rectally once a day, As needed, If no bowel movement by postoperative day #2  -- Indication: For COnstipation    docusate sodium 100 mg oral capsule  -- 1 cap(s) by mouth 3 times a day  -- Indication: For COnstipation    polyethylene glycol 3350 oral powder for reconstitution  -- 17 gram(s) by mouth once a day  -- Indication: For COnstipation    senna oral tablet  -- 2 tab(s) by mouth once a day (at bedtime), As needed, Constipation  -- Indication: For COnstipation    lactulose 10 g/15 mL oral syrup  -- 15 milliliter(s) by mouth once a day, As needed, constipation  -- Indication: For COnstipation    pantoprazole 40 mg oral delayed release tablet  -- 1 tab(s) by mouth once a day  -- Indication: For PPI while on aspirin therapy    Multiple Vitamins oral tablet  -- 1 tab(s) by mouth once a day  -- Indication: For supplement

## 2017-04-10 NOTE — DISCHARGE NOTE ADULT - CARE PROVIDER_API CALL
Km Shin), Orthopaedic Surgery  [address] [place] [id number]  Phone: [phone]  Fax: [phone]

## 2017-04-10 NOTE — DISCHARGE NOTE ADULT - ADDITIONAL INSTRUCTIONS
Follow up with your pain management doctor. Follow up with your primary care and pain management doctor.

## 2017-04-10 NOTE — PROGRESS NOTE ADULT - SUBJECTIVE AND OBJECTIVE BOX
S: Pain present but controlled.  Ambulating in room with PT.  Urinating well, passed flatus.  Denies F/C/N/V/SOB/CP.    O:  Afeb, -165/80-93   Dressing c/d/i with ice bags in place  EHL/FHL 5/5  SILT in sural/saphenous/peroneals/tibial n  Pedal pulses 2+/4  Calves supple, NTTP

## 2017-04-10 NOTE — PROGRESS NOTE ADULT - SUBJECTIVE AND OBJECTIVE BOX
Pain Management Progress Note - [facility] [phone]    HPI:  Pt c/o continued knee and chronic back pain.  PCA restarted this weekend due to pain uncontrolled with PO pain meds.  Pt states pain moderatley controlled today.  Pt states as outpatient for the past 4 weeks she was taking Methadone 10mg TID and was previously on oxycodone 10mg TID.          Pertinent PMH: Pain at: ___Back ___Neck__x_Knee ___Hip ___Shoulder ___ Opioid tolerance    Pain is __x_ sharp _x___dull ___burning ___achy ___ Intensity: ____ mild __x__mod ____severe     Location __x___surgical site _____cervical _____lumbar ____abd _____upper ext____lower ext    Worse with ___x_activity _x___movement _____physical therapy___ Rest    Improved with _x__medication _x___rest ____physical therapy          ROS: Const:  ___febrile   Eyes:___ENT:___CV: _-__chest pain  Resp: _-___sob  GI:___nausea ___vomiting ____abd pain ___npo ___clears ___full diet __bm  :___ Musk: ___pain ___spasm  Skin:___ Neuro:  ___sedation___confusion____ numbness ___weakness ___paresthesia  Psych:___anxiety  Endo:___ Heme:___Allergy:___      Vital Signs Last 24 Hrs  T(C): 36.9, Max: 37.6 (04-09 @ 21:15)  T(F): 98.5, Max: 99.6 (04-09 @ 21:15)  HR: 82 (76 - 83)  BP: 146/80 (139/85 - 165/93)  BP(mean): --  RR: 16 (16 - 17)  SpO2: 96% (96% - 96%)     PHYSICAL EXAM:  Gen Appearance: x___no acute distress x___appropriate         Neuro: __x_SILT feet__x__ EOM Intact Psych: AAOX_3_, ___mood/affect appropriate        Eyes: _x__conjunctiva WNL  _____ Pupils equal and round        ENT: __x_ears and nose atraumatic___ Hearing grossly intact        Neck: _x__trachea midline, no visible masses ___thyroid without palpable mass    Resp: __x_Nml WOB____No tactile fremitus ___clear to auscultation    Cardio: __x_extremities free from edema ____pedal pulses palpable    GI/Abdomen: ___xsoft __x___ Nontender______Nondistended_____HSM    Lymphatic: ___xno palpable nodes in neck  ___no palpable nodes calves and feet    Skin/Wound: __x_Incision, x___Dressing c/d/i,   ____surrounding tissues soft,  ___drain/chest tube present____    Muscular: EHL _5__/5  Gastrocnemius_5__/5    ___absent clubbing/cyanosis         ASSESSMENT:  This is a 52y old Female with a history of:  M17.11  Unknown h/o HF  Family history of malignant neoplasm (Mother)  Family history of ischemic heart disease (Father)  Handoff  MEWS Score  Thyrotoxicosis  Hyperparathyroidism  Type 2 diabetes mellitus  Nontraumatic extradural hemorrhage  Essential hypertension  Primary osteoarthritis of right knee  Primary osteoarthritis of right knee  Thyrotoxicosis without thyroid storm, unspecified thyrotoxicosis type  Hypovitaminosis D  COPD, mild  Hyperparathyroidism  Type 2 diabetes mellitus without complication, without long-term current use of insulin  Essential hypertension  Primary osteoarthritis of right knee  Total knee arthroplasty  History of breast surgery  Other postprocedural status  Chronic PainS/P Right TKR        Recommended Treatment PLAN:    1.  D/C PCA  2.  Start Oxycontin 10mg q12h  3.  Dilaudid 4mg q4h prn  4.  Dilaudid 1mg IV q3h prn  5.  Toradol 15mg q6h x 4 doses

## 2017-04-10 NOTE — PROGRESS NOTE ADULT - SUBJECTIVE AND OBJECTIVE BOX
HPI:  52 year old female with osteoarthritis right knee with moderate knee pain, deformity, decreased ROM and unsteady gait progressively increased for years. MRI confirmed diagnosis.  Symptoms worse with stairs and after prolonged imobility.  Patient day #3 post op right TKR with moderate knee pain and malaise.  Patient was seen in cardiac consult for poor control of BP.        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 1000milliLiter(s) IV Continuous <Continuous>  aspirin enteric coated 325milliGRAM(s) Oral two times a day  pantoprazole    Tablet 40milliGRAM(s) Oral daily  polyethylene glycol 3350 17Gram(s) Oral daily  docusate sodium 100milliGRAM(s) Oral three times a day  ferrous    sulfate 325milliGRAM(s) Oral three times a day with meals  multivitamin 1Tablet(s) Oral daily  insulin lispro (HumaLOG) corrective regimen sliding scale  SubCutaneous Before meals and at bedtime  dextrose 5%. 1000milliLiter(s) IV Continuous <Continuous>  dextrose 50% Injectable 12.5Gram(s) IV Push once  dextrose 50% Injectable 25Gram(s) IV Push once  dextrose 50% Injectable 25Gram(s) IV Push once  BUpivacaine liposome 1.3% Injectable (no eMAR) 20milliLiter(s) Local Injection once  atorvastatin 20milliGRAM(s) Oral at bedtime  ATENolol  Tablet 50milliGRAM(s) Oral two times a day  hydrochlorothiazide   Tablet 50milliGRAM(s) Oral daily  pregabalin 25milliGRAM(s) Oral three times a day  HYDROmorphone PCA (1 mG/mL) 30milliLiter(s) PCA Continuous PCA Continuous    MEDICATIONS  (PRN):  aluminum hydroxide/magnesium hydroxide/simethicone Suspension 30milliLiter(s) Oral four times a day PRN Indigestion  magnesium hydroxide Suspension 30milliLiter(s) Oral daily PRN Constipation  bisacodyl Suppository 10milliGRAM(s) Rectal daily PRN If no bowel movement by postoperative day #2  senna 2Tablet(s) Oral at bedtime PRN Constipation  dextrose Gel 1Dose(s) Oral once PRN Blood Glucose LESS THAN 70 milliGRAM(s)/deciliter  glucagon  Injectable 1milliGRAM(s) IntraMuscular once PRN Glucose LESS THAN 70 milligrams/deciliter  zolpidem 5milliGRAM(s) Oral at bedtime PRN Insomnia  naloxone Injectable 0.1milliGRAM(s) IV Push every 3 minutes PRN For ANY of the following changes in patient status:  A. RR LESS THAN 10 breaths per minute, B. Oxygen saturation LESS THAN 90%, C. Sedation score of 6  ondansetron Injectable 4milliGRAM(s) IV Push every 6 hours PRN Nausea  oxyCODONE  5 mG/acetaminophen 325 mG 2Tablet(s) Oral every 3 hours PRN Severe Pain (7 - 10)  HYDROmorphone PCA (1 mG/mL) Rescue Clinician Bolus 1.5milliGRAM(s) IV Push every 2 hours PRN for Pain Scale GREATER THAN 6  acetaminophen   Tablet 650milliGRAM(s) Oral every 6 hours PRN For Temp greater than 38 C (100.4 F)      Allergies    No Known Allergies    Intolerances    OxyContin (Pruritus)      REVIEW OF SYSTEMS    General:  malaise	  Skin/Breast: normal  Ophthalmologic: negative  ENMT:	normal  Respiratory and Thorax: normal  Cardiovascular:	normal  Gastrointestinal:	normal  Genitourinary:	normal  Musculoskeletal: right knee swelling   Neurological:	normal  Psychiatric:	normal  Hematology/Lymphatics:	negative  Endocrine:	negative  Allergic/Immunologic:	negative      PHYSICAL EXAM:    Vital Signs Last 24 Hrs  T(C): 36.9, Max: 37.6 (04-09 @ 21:15)  T(F): 98.5, Max: 99.6 (04-09 @ 21:15)  HR: 82 (71 - 83)  BP: 146/80 (139/85 - 165/93)  BP(mean): --  RR: 16 (16 - 18)  SpO2: 96% (95% - 97%)    Constitutional: WDWNF in NAD  Eyes: conj pale  ENMT: negative  Neck: supple  Breasts: not examined   Back: negative  Respiratory: clear to P&A  Cardiovascular: no MRGT or H  Gastrointestinal: normal bowel sounds  Genitourinary: neg  Rectal: not examined  Extremities: edema right leg  Vascular: normal  Neurological: normal  Skin: negative  Lymph Nodes: negative  Musculoskeletal:   decreased ROM  right knee  Psychiatric: anxiety                          11.5   13.1  )-----------( 389      ( 09 Apr 2017 06:36 )             34.3       04-09    131<L>  |  95<L>  |  6<L>  ----------------------------<  162<H>  3.3<L>   |  26  |  0.72    Ca    11.1<H>      09 Apr 2017 06:36

## 2017-04-11 LAB
ANION GAP SERPL CALC-SCNC: 8 MMOL/L — LOW (ref 9–16)
BUN SERPL-MCNC: 26 MG/DL — HIGH (ref 7–23)
CALCIUM SERPL-MCNC: 11.1 MG/DL — HIGH (ref 8.5–10.5)
CHLORIDE SERPL-SCNC: 96 MMOL/L — SIGNIFICANT CHANGE UP (ref 96–108)
CO2 SERPL-SCNC: 29 MMOL/L — SIGNIFICANT CHANGE UP (ref 22–31)
CREAT SERPL-MCNC: 0.98 MG/DL — SIGNIFICANT CHANGE UP (ref 0.5–1.3)
GLUCOSE SERPL-MCNC: 165 MG/DL — HIGH (ref 70–99)
HCT VFR BLD CALC: 32.8 % — LOW (ref 34.5–45)
HGB BLD-MCNC: 10.9 G/DL — LOW (ref 11.5–15.5)
MCHC RBC-ENTMCNC: 27.2 PG — SIGNIFICANT CHANGE UP (ref 27–34)
MCHC RBC-ENTMCNC: 33.2 G/DL — SIGNIFICANT CHANGE UP (ref 32–36)
MCV RBC AUTO: 81.8 FL — SIGNIFICANT CHANGE UP (ref 80–100)
PLATELET # BLD AUTO: 407 K/UL — HIGH (ref 150–400)
POTASSIUM SERPL-MCNC: 3.5 MMOL/L — SIGNIFICANT CHANGE UP (ref 3.5–5.3)
POTASSIUM SERPL-SCNC: 3.5 MMOL/L — SIGNIFICANT CHANGE UP (ref 3.5–5.3)
RBC # BLD: 4.01 M/UL — SIGNIFICANT CHANGE UP (ref 3.8–5.2)
RBC # FLD: 14 % — SIGNIFICANT CHANGE UP (ref 10.3–16.9)
SODIUM SERPL-SCNC: 133 MMOL/L — LOW (ref 135–145)
WBC # BLD: 8 K/UL — SIGNIFICANT CHANGE UP (ref 3.8–10.5)
WBC # FLD AUTO: 8 K/UL — SIGNIFICANT CHANGE UP (ref 3.8–10.5)

## 2017-04-11 RX ORDER — HYDROMORPHONE HYDROCHLORIDE 2 MG/ML
6 INJECTION INTRAMUSCULAR; INTRAVENOUS; SUBCUTANEOUS EVERY 4 HOURS
Qty: 0 | Refills: 0 | Status: DISCONTINUED | OUTPATIENT
Start: 2017-04-11 | End: 2017-04-13

## 2017-04-11 RX ORDER — HYDROMORPHONE HYDROCHLORIDE 2 MG/ML
1 INJECTION INTRAMUSCULAR; INTRAVENOUS; SUBCUTANEOUS EVERY 4 HOURS
Qty: 0 | Refills: 0 | Status: DISCONTINUED | OUTPATIENT
Start: 2017-04-11 | End: 2017-04-13

## 2017-04-11 RX ORDER — OXYCODONE HYDROCHLORIDE 5 MG/1
10 TABLET ORAL EVERY 8 HOURS
Qty: 0 | Refills: 0 | Status: DISCONTINUED | OUTPATIENT
Start: 2017-04-11 | End: 2017-04-13

## 2017-04-11 RX ADMIN — Medication 1 TABLET(S): at 11:54

## 2017-04-11 RX ADMIN — HYDROMORPHONE HYDROCHLORIDE 1 MILLIGRAM(S): 2 INJECTION INTRAMUSCULAR; INTRAVENOUS; SUBCUTANEOUS at 16:12

## 2017-04-11 RX ADMIN — HYDROMORPHONE HYDROCHLORIDE 4 MILLIGRAM(S): 2 INJECTION INTRAMUSCULAR; INTRAVENOUS; SUBCUTANEOUS at 04:28

## 2017-04-11 RX ADMIN — Medication 325 MILLIGRAM(S): at 07:59

## 2017-04-11 RX ADMIN — PANTOPRAZOLE SODIUM 40 MILLIGRAM(S): 20 TABLET, DELAYED RELEASE ORAL at 11:54

## 2017-04-11 RX ADMIN — Medication 100 MILLIGRAM(S): at 21:41

## 2017-04-11 RX ADMIN — Medication 25 MILLIGRAM(S): at 05:48

## 2017-04-11 RX ADMIN — Medication 15 MILLIGRAM(S): at 00:26

## 2017-04-11 RX ADMIN — HYDROMORPHONE HYDROCHLORIDE 1 MILLIGRAM(S): 2 INJECTION INTRAMUSCULAR; INTRAVENOUS; SUBCUTANEOUS at 20:35

## 2017-04-11 RX ADMIN — HYDROMORPHONE HYDROCHLORIDE 1 MILLIGRAM(S): 2 INJECTION INTRAMUSCULAR; INTRAVENOUS; SUBCUTANEOUS at 20:20

## 2017-04-11 RX ADMIN — ATENOLOL 50 MILLIGRAM(S): 25 TABLET ORAL at 07:03

## 2017-04-11 RX ADMIN — HYDROMORPHONE HYDROCHLORIDE 4 MILLIGRAM(S): 2 INJECTION INTRAMUSCULAR; INTRAVENOUS; SUBCUTANEOUS at 09:54

## 2017-04-11 RX ADMIN — Medication 15 MILLIGRAM(S): at 00:07

## 2017-04-11 RX ADMIN — HYDROMORPHONE HYDROCHLORIDE 6 MILLIGRAM(S): 2 INJECTION INTRAMUSCULAR; INTRAVENOUS; SUBCUTANEOUS at 14:07

## 2017-04-11 RX ADMIN — OXYCODONE HYDROCHLORIDE 10 MILLIGRAM(S): 5 TABLET ORAL at 23:15

## 2017-04-11 RX ADMIN — Medication 325 MILLIGRAM(S): at 05:48

## 2017-04-11 RX ADMIN — HYDROMORPHONE HYDROCHLORIDE 6 MILLIGRAM(S): 2 INJECTION INTRAMUSCULAR; INTRAVENOUS; SUBCUTANEOUS at 18:41

## 2017-04-11 RX ADMIN — HYDROMORPHONE HYDROCHLORIDE 4 MILLIGRAM(S): 2 INJECTION INTRAMUSCULAR; INTRAVENOUS; SUBCUTANEOUS at 08:54

## 2017-04-11 RX ADMIN — OXYCODONE HYDROCHLORIDE 10 MILLIGRAM(S): 5 TABLET ORAL at 14:39

## 2017-04-11 RX ADMIN — HYDROMORPHONE HYDROCHLORIDE 6 MILLIGRAM(S): 2 INJECTION INTRAMUSCULAR; INTRAVENOUS; SUBCUTANEOUS at 22:30

## 2017-04-11 RX ADMIN — OXYCODONE HYDROCHLORIDE 10 MILLIGRAM(S): 5 TABLET ORAL at 06:45

## 2017-04-11 RX ADMIN — ATORVASTATIN CALCIUM 20 MILLIGRAM(S): 80 TABLET, FILM COATED ORAL at 21:40

## 2017-04-11 RX ADMIN — Medication 15 MILLIGRAM(S): at 06:05

## 2017-04-11 RX ADMIN — Medication 15 MILLIGRAM(S): at 05:48

## 2017-04-11 RX ADMIN — OXYCODONE HYDROCHLORIDE 10 MILLIGRAM(S): 5 TABLET ORAL at 13:39

## 2017-04-11 RX ADMIN — POLYETHYLENE GLYCOL 3350 17 GRAM(S): 17 POWDER, FOR SOLUTION ORAL at 11:54

## 2017-04-11 RX ADMIN — Medication 25 MILLIGRAM(S): at 13:39

## 2017-04-11 RX ADMIN — Medication 100 MILLIGRAM(S): at 13:39

## 2017-04-11 RX ADMIN — HYDROMORPHONE HYDROCHLORIDE 1 MILLIGRAM(S): 2 INJECTION INTRAMUSCULAR; INTRAVENOUS; SUBCUTANEOUS at 16:45

## 2017-04-11 RX ADMIN — ATENOLOL 50 MILLIGRAM(S): 25 TABLET ORAL at 17:41

## 2017-04-11 RX ADMIN — OXYCODONE HYDROCHLORIDE 10 MILLIGRAM(S): 5 TABLET ORAL at 22:34

## 2017-04-11 RX ADMIN — Medication 100 MILLIGRAM(S): at 05:48

## 2017-04-11 RX ADMIN — HYDROMORPHONE HYDROCHLORIDE 6 MILLIGRAM(S): 2 INJECTION INTRAMUSCULAR; INTRAVENOUS; SUBCUTANEOUS at 17:41

## 2017-04-11 RX ADMIN — HYDROMORPHONE HYDROCHLORIDE 6 MILLIGRAM(S): 2 INJECTION INTRAMUSCULAR; INTRAVENOUS; SUBCUTANEOUS at 13:07

## 2017-04-11 RX ADMIN — HYDROMORPHONE HYDROCHLORIDE 4 MILLIGRAM(S): 2 INJECTION INTRAMUSCULAR; INTRAVENOUS; SUBCUTANEOUS at 05:28

## 2017-04-11 RX ADMIN — Medication 25 MILLIGRAM(S): at 21:40

## 2017-04-11 RX ADMIN — OXYCODONE HYDROCHLORIDE 10 MILLIGRAM(S): 5 TABLET ORAL at 05:48

## 2017-04-11 RX ADMIN — HYDROMORPHONE HYDROCHLORIDE 6 MILLIGRAM(S): 2 INJECTION INTRAMUSCULAR; INTRAVENOUS; SUBCUTANEOUS at 21:41

## 2017-04-11 RX ADMIN — Medication 325 MILLIGRAM(S): at 17:40

## 2017-04-11 NOTE — PROGRESS NOTE ADULT - SUBJECTIVE AND OBJECTIVE BOX
HPI:  52 year old female with osteoarthritis right knee with moderate knee pain, deformity, decreased ROM and unsteady gait progressively increased for years. MRI confirmed diagnosis.  Symptoms worse with stairs and after prolonged imobility.  Patient day #4 post op right TKR with moderate knee pain and malaise.  Patient was seen in cardiac consult for control of BP.  Awaiting transfer to [facility].        PAST MEDICAL & SURGICAL HISTORY:  Thyrotoxicosis: Hyperthyroidism  Hyperparathyroidism:   Type 2 diabetes mellitus  Nontraumatic extradural hemorrhage: L2-L3  Essential hypertension  History of breast surgery: bilateral- Feb.2016/ March 2016  Other postprocedural status: S/P spinal surgery  History of total knee replacement      MEDICATIONS  (STANDING):  lactated ringers. 1000milliLiter(s) IV Continuous <Continuous>  aspirin enteric coated 325milliGRAM(s) Oral two times a day  pantoprazole    Tablet 40milliGRAM(s) Oral daily  polyethylene glycol 3350 17Gram(s) Oral daily  docusate sodium 100milliGRAM(s) Oral three times a day  ferrous    sulfate 325milliGRAM(s) Oral three times a day with meals  multivitamin 1Tablet(s) Oral daily  insulin lispro (HumaLOG) corrective regimen sliding scale  SubCutaneous Before meals and at bedtime  dextrose 5%. 1000milliLiter(s) IV Continuous <Continuous>  dextrose 50% Injectable 12.5Gram(s) IV Push once  dextrose 50% Injectable 25Gram(s) IV Push once  dextrose 50% Injectable 25Gram(s) IV Push once  BUpivacaine liposome 1.3% Injectable (no eMAR) 20milliLiter(s) Local Injection once  atorvastatin 20milliGRAM(s) Oral at bedtime  ATENolol  Tablet 50milliGRAM(s) Oral two times a day  hydrochlorothiazide   Tablet 50milliGRAM(s) Oral daily  pregabalin 25milliGRAM(s) Oral three times a day  oxyCODONE ER Tablet 10milliGRAM(s) Oral every 12 hours    MEDICATIONS  (PRN):  aluminum hydroxide/magnesium hydroxide/simethicone Suspension 30milliLiter(s) Oral four times a day PRN Indigestion  magnesium hydroxide Suspension 30milliLiter(s) Oral daily PRN Constipation  bisacodyl Suppository 10milliGRAM(s) Rectal daily PRN If no bowel movement by postoperative day #2  senna 2Tablet(s) Oral at bedtime PRN Constipation  dextrose Gel 1Dose(s) Oral once PRN Blood Glucose LESS THAN 70 milliGRAM(s)/deciliter  glucagon  Injectable 1milliGRAM(s) IntraMuscular once PRN Glucose LESS THAN 70 milligrams/deciliter  zolpidem 5milliGRAM(s) Oral at bedtime PRN Insomnia  naloxone Injectable 0.1milliGRAM(s) IV Push every 3 minutes PRN For ANY of the following changes in patient status:  A. RR LESS THAN 10 breaths per minute, B. Oxygen saturation LESS THAN 90%, C. Sedation score of 6  ondansetron Injectable 4milliGRAM(s) IV Push every 6 hours PRN Nausea  acetaminophen   Tablet 650milliGRAM(s) Oral every 6 hours PRN For Temp greater than 38 C (100.4 F)  HYDROmorphone   Tablet 4milliGRAM(s) Oral every 4 hours PRN Moderate Pain (4 - 6)  HYDROmorphone  Injectable 1milliGRAM(s) IV Push every 4 hours PRN Severe Pain (7 - 10)      Allergies    No Known Allergies    Intolerances    OxyContin (Pruritus)      REVIEW OF SYSTEMS    General:  malaise	  Skin/Breast: normal  Ophthalmologic: negative  ENMT:	normal  Respiratory and Thorax: normal  Cardiovascular:	normal  Gastrointestinal:	normal  Genitourinary:	normal  Musculoskeletal: right knee swelling   Neurological:	normal  Psychiatric:	normal  Hematology/Lymphatics:	negative  Endocrine:	negative  Allergic/Immunologic:	negative      PHYSICAL EXAM:    Vital Signs Last 24 Hrs  T(C): 36.6, Max: 37.1 (04-10 @ 08:00)  T(F): 97.9, Max: 98.7 (04-10 @ 08:00)  HR: 74 (72 - 78)  BP: 118/76 (112/68 - 135/84)  BP(mean): --  RR: 14 (14 - 16)  SpO2: 98% (95% - 98%)    Constitutional: WDWNF in NAD  Eyes: conj pale  ENMT: negative  Neck: supple  Breasts: not examined   Back: negative  Respiratory: clear to P&A  Cardiovascular: no MRGT or H  Gastrointestinal: normal bowel sounds  Genitourinary: neg  Rectal: not examined  Extremities: edema right leg  Vascular: normal  Neurological: normal  Skin: negative  Lymph Nodes: negative  Musculoskeletal:   decreased ROM  right knee  Psychiatric: anxiety                        11.6   12.4  )-----------( 405      ( 10 Apr 2017 06:24 )             34.0       04-10    132<L>  |  94<L>  |  12  ----------------------------<  167<H>  3.5   |  29  |  0.79    Ca    11.1<H>      10 Apr 2017 06:24

## 2017-04-11 NOTE — PROGRESS NOTE ADULT - SUBJECTIVE AND OBJECTIVE BOX
Pt. seen at 1018  Pain Management Progress Note - [facility] [phone]    HPI:  Pt. continues to complain of severe pain in the right knee.  Denies side effects from pain medications.            Pertinent PMH: Pain at: ___Back ___Neck_x__Knee ___Hip ___Shoulder _x__ Opioid tolerance    Pain is __x_ sharp ____dull ___burning ___achy ___ Intensity: ____ mild ____mod ____severe   x  Location __x___surgical site _____cervical _____lumbar ____abd _____upper ext__x__lower ext    Worse with _x___activity _x___movement _____physical therapy___ Rest    Improved with __x__medication __x__rest ____physical therapy    lactated ringers.  aspirin enteric coated  aluminum hydroxide/magnesium hydroxide/simethicone Suspension  pantoprazole    Tablet  magnesium hydroxide Suspension  polyethylene glycol 3350  bisacodyl Suppository  senna  docusate sodium  ferrous    sulfate  multivitamin  insulin lispro (HumaLOG) corrective regimen sliding scale  dextrose 5%.  dextrose Gel  dextrose 50% Injectable  dextrose 50% Injectable  dextrose 50% Injectable  glucagon  Injectable  atorvastatin  zolpidem  ATENolol  Tablet  hydrochlorothiazide   Tablet  naloxone Injectable  ondansetron Injectable  pregabalin  acetaminophen   Tablet  HYDROmorphone   Tablet  HYDROmorphone  Injectable  oxyCODONE ER Tablet  HYDROmorphone   Tablet      ROS: Const:  ___febrile   Eyes:___ENT:___CV: ___chest pain  Resp: ____sob  GI:_-__nausea _-__vomiting __-__abd pain ___npo ___clears _+__full diet __bm  :___ Musk: _+__pain ___spasm  Skin:___ Neuro:  __-_srlsczwt__-_eryufdqns_-___ numbness __-_weakness ___paresthesia  Psych:___anxiety  Endo:___ Heme:___Allergy:___      04-11 @ 06:1966 mL/min/1.73M2          Hemoglobin: 10.9 g/dL (04-11 @ 06:20)  Hemoglobin: 11.6 g/dL (04-10 @ 06:24)        T(C): 36.8, Max: 36.8 (04-11 @ 08:49)  HR: 75 (72 - 78)  BP: 110/72 (110/72 - 124/72)  RR: 16 (14 - 16)  SpO2: 98% (97% - 98%)  Wt(kg): --     PHYSICAL EXAM:  Gen Appearance: _x__no acute distress _x__appropriate         Neuro: _x__SILT feet__x__ EOM Intact Psych: AAOX_3_, _x__mood/affect appropriate        Eyes: _x__conjunctiva WNL  ___x__ Pupils equal and round        ENT: __x_ears and nose atraumatic_x__ Hearing grossly intact        Neck: ___trachea midline, no visible masses ___thyroid without palpable mass    Resp: _x__Nml WOB____No tactile fremitus ___clear to auscultation    Cardio: ___extremities free from edema ____pedal pulses palpable    GI/Abdomen: _x__soft __x___ Nontender______Nondistended_____HSM    Lymphatic: ___no palpable nodes in neck  ___no palpable nodes calves and feet    Skin/Wound: ___Incision, __x_Dressing c/d/i,   ____surrounding tissues soft,  ___drain/chest tube present____    Muscular: EHL _5__/5  Gastrocnemius___/5    __x_absent clubbing/cyanosis         ASSESSMENT:  This is a 52y old Female with a history of:  Family history of malignant neoplasm (Mother)  Family history of ischemic heart disease (Father)  Thyrotoxicosis  Hyperparathyroidism  Type 2 diabetes mellitus  Nontraumatic extradural hemorrhage  Essential hypertension  Primary osteoarthritis of right knee  Hypovitaminosis D  COPD, mild  Total knee arthroplasty  History of breast surgery  Other postprocedural status  History of total knee replacement  opioid dependence and right knee pain and is now S/P right total knee replacement.      Recommended Treatment PLAN:  1.  Increase oxycontin to 30 mg po q8h  2.  Increase dilaudid 4-6 mg po q4h prn  3.  Dilaudid 1 mg IV q3h prn  4.  Lyrica 25 mg po TID  5.  Celebrex 200 mg BID

## 2017-04-11 NOTE — PROGRESS NOTE ADULT - SUBJECTIVE AND OBJECTIVE BOX
S: Pain present but controlled.  Ambulating bedside with PT.  Urinating well, passed flatus.  Denies F/C/N/V/SOB/CP.    O:  Afeb, BP 110s-130s/70s  Dressing c/d/i and acceptable postop swelling  EHL/FHL 5/5  SILT in sural/saphenous/peroneals/tibial n  Pedal pulses 2+/4  Calves supple, NTTP, negative Partha's

## 2017-04-11 NOTE — PROGRESS NOTE ADULT - PROBLEM SELECTOR PLAN 1
Discussed with house staff and nursing staff.  Pain management, PT, incentive spirometer, DVT prophylaxis, repeat labs and discharge planning.  For [facility]

## 2017-04-11 NOTE — PROGRESS NOTE ADULT - SUBJECTIVE AND OBJECTIVE BOX
ORTHO NOTE    [x ] Pt seen/examined.  [ ] Pt without any complaints/in NAD.    [x ] Pt complains of: severe incisional pain      ROS: [ ] Fever  [ ] Chills  [ ] CP [ ] SOB [ ] Dysnea  [ ] Palpitations [ ] Cough [ ] N/V/C/D [ ] Paresthia [ ] Other     [x ] ROS  otherwise negative    .    PHYSICAL EXAM:    Vital Signs Last 24 Hrs  T(C): 36.8, Max: 36.8 (04-11 @ 08:49)  T(F): 98.2, Max: 98.2 (04-11 @ 08:49)  HR: 75 (72 - 78)  BP: 110/72 (110/72 - 124/72)  BP(mean): --  RR: 16 (14 - 16)  SpO2: 98% (97% - 98%)    I&O's Detail  I & Os for 24h ending 11 Apr 2017 07:00  =============================================  IN:    Oral Fluid: 900 ml    lactated ringers.: 420 ml    Total IN: 1320 ml  ---------------------------------------------  OUT:    Voided: 2350 ml    Total OUT: 2350 ml  ---------------------------------------------  Total NET: -1030 ml    I & Os for current day (as of 11 Apr 2017 10:57)  =============================================  IN:    Oral Fluid: 360 ml    Total IN: 360 ml  ---------------------------------------------  OUT:    Voided: 400 ml    Total OUT: 400 ml  ---------------------------------------------  Total NET: -40 ml       CAPILLARY BLOOD GLUCOSE  150 (11 Apr 2017 06:41)  186 (10 Apr 2017 21:40)  138 (10 Apr 2017 16:07)  155 (10 Apr 2017 11:12)                  Neuro: AAOX3    Lungs: CTA, IS demonstrated    CV:    ABD: soft, nontender, bowel regimen    Ext: right knee aquacell cdi, R LE NVID, ROM limited by pain control but patient OOB to chair    LABS                        10.9   8.0   )-----------( 407      ( 11 Apr 2017 06:20 )             32.8                                04-11    133<L>  |  96  |  26<H>  ----------------------------<  165<H>  3.5   |  29  |  0.98    Ca    11.1<H>      11 Apr 2017 06:19        [ ] Other Labs  [ ] None ordered            Please check or [place] when present:  •  Heart Failure:    [ ] Acute        [ ]  Acute on Chronic        [ ] Chronic         [ ] Diastolic     [ ]  Combined    •  LIVIA:     [ ] ATN        [ ]  Renal medullary necrosis       [ ]  Renal cortical necrosis                  [ ] Other pathological Lesion:  •  CKD:  [ ] Stage I   [ ] Stage II  [ ] Stage III    [ ]Stage IV   [ ]  CKD V   [ ]  Other/Unspecified:    •  Abdominal Nutritional Status:   [ ] Malnutrition-See Nutrition note    [ ] Cachexia   [ ]  Other        [ ] Supplement ordered:            [ ] Morbid Obesity: BMI >=40         ASSESSMENT/PLAN:      STATUS POST: R TKA  Pain management adjusting   CONTINUE:          [ ] PT    [ ] DVT PPX-    [ ] Pain Mgt    [ ] Dispo plan- ORTHO NOTE    [x ] Pt seen/examined.  [ ] Pt without any complaints/in NAD.    [x ] Pt complains of: severe incisional pain      ROS: [ ] Fever  [ ] Chills  [ ] CP [ ] SOB [ ] Dysnea  [ ] Palpitations [ ] Cough [ ] N/V/C/D [ ] Paresthia [ ] Other     [x ] ROS  otherwise negative    .    PHYSICAL EXAM:    Vital Signs Last 24 Hrs  T(C): 36.8, Max: 36.8 (04-11 @ 08:49)  T(F): 98.2, Max: 98.2 (04-11 @ 08:49)  HR: 75 (72 - 78)  BP: 110/72 (110/72 - 124/72)  BP(mean): --  RR: 16 (14 - 16)  SpO2: 98% (97% - 98%)    I&O's Detail  I & Os for 24h ending 11 Apr 2017 07:00  =============================================  IN:    Oral Fluid: 900 ml    lactated ringers.: 420 ml    Total IN: 1320 ml  ---------------------------------------------  OUT:    Voided: 2350 ml    Total OUT: 2350 ml  ---------------------------------------------  Total NET: -1030 ml    I & Os for current day (as of 11 Apr 2017 10:57)  =============================================  IN:    Oral Fluid: 360 ml    Total IN: 360 ml  ---------------------------------------------  OUT:    Voided: 400 ml    Total OUT: 400 ml  ---------------------------------------------  Total NET: -40 ml       CAPILLARY BLOOD GLUCOSE  150 (11 Apr 2017 06:41)  186 (10 Apr 2017 21:40)  138 (10 Apr 2017 16:07)  155 (10 Apr 2017 11:12)                  Neuro: AAOX3    Lungs: CTA, IS demonstrated    CV:    ABD: soft, nontender, bowel regimen    Ext: right knee aquacell cdi, R LE NVID, ROM limited by pain control but patient OOB to chair    LABS                        10.9   8.0   )-----------( 407      ( 11 Apr 2017 06:20 )             32.8                                04-11    133<L>  |  96  |  26<H>  ----------------------------<  165<H>  3.5   |  29  |  0.98    Ca    11.1<H>      11 Apr 2017 06:19        [ ] Other Labs  [ ] None ordered            Please check or [place] when present:  •  Heart Failure:    [ ] Acute        [ ]  Acute on Chronic        [ ] Chronic         [ ] Diastolic     [ ]  Combined    •  LIVIA:     [ ] ATN        [ ]  Renal medullary necrosis       [ ]  Renal cortical necrosis                  [ ] Other pathological Lesion:  •  CKD:  [ ] Stage I   [ ] Stage II  [ ] Stage III    [ ]Stage IV   [ ]  CKD V   [ ]  Other/Unspecified:    •  Abdominal Nutritional Status:   [ ] Malnutrition-See Nutrition note    [ ] Cachexia   [ ]  Other        [ ] Supplement ordered:            [ ] Morbid Obesity: BMI >=40         ASSESSMENT/PLAN:      STATUS POST: R TKA  Pain management adjusting regimen and adding celebrex  VIC choices sent and patient to remain at [facility] today for pain management control  CONTINUE:          [ ] PT- WBAT    [ ] DVT PPX- ASA bid, scd boots    [ ] Pain Mgt- pain management following    [ ] Dispo plan- VIC

## 2017-04-12 DIAGNOSIS — D50.0 IRON DEFICIENCY ANEMIA SECONDARY TO BLOOD LOSS (CHRONIC): ICD-10-CM

## 2017-04-12 RX ORDER — IBUPROFEN 200 MG
600 TABLET ORAL EVERY 6 HOURS
Qty: 0 | Refills: 0 | Status: DISCONTINUED | OUTPATIENT
Start: 2017-04-12 | End: 2017-04-13

## 2017-04-12 RX ADMIN — ATENOLOL 50 MILLIGRAM(S): 25 TABLET ORAL at 11:49

## 2017-04-12 RX ADMIN — Medication 25 MILLIGRAM(S): at 05:09

## 2017-04-12 RX ADMIN — OXYCODONE HYDROCHLORIDE 10 MILLIGRAM(S): 5 TABLET ORAL at 16:00

## 2017-04-12 RX ADMIN — HYDROMORPHONE HYDROCHLORIDE 1 MILLIGRAM(S): 2 INJECTION INTRAMUSCULAR; INTRAVENOUS; SUBCUTANEOUS at 00:22

## 2017-04-12 RX ADMIN — POLYETHYLENE GLYCOL 3350 17 GRAM(S): 17 POWDER, FOR SOLUTION ORAL at 11:49

## 2017-04-12 RX ADMIN — Medication 1: at 08:09

## 2017-04-12 RX ADMIN — HYDROMORPHONE HYDROCHLORIDE 1 MILLIGRAM(S): 2 INJECTION INTRAMUSCULAR; INTRAVENOUS; SUBCUTANEOUS at 14:10

## 2017-04-12 RX ADMIN — HYDROMORPHONE HYDROCHLORIDE 6 MILLIGRAM(S): 2 INJECTION INTRAMUSCULAR; INTRAVENOUS; SUBCUTANEOUS at 05:09

## 2017-04-12 RX ADMIN — Medication 325 MILLIGRAM(S): at 07:00

## 2017-04-12 RX ADMIN — OXYCODONE HYDROCHLORIDE 10 MILLIGRAM(S): 5 TABLET ORAL at 23:27

## 2017-04-12 RX ADMIN — HYDROMORPHONE HYDROCHLORIDE 1 MILLIGRAM(S): 2 INJECTION INTRAMUSCULAR; INTRAVENOUS; SUBCUTANEOUS at 06:04

## 2017-04-12 RX ADMIN — PANTOPRAZOLE SODIUM 40 MILLIGRAM(S): 20 TABLET, DELAYED RELEASE ORAL at 11:49

## 2017-04-12 RX ADMIN — ATENOLOL 50 MILLIGRAM(S): 25 TABLET ORAL at 22:47

## 2017-04-12 RX ADMIN — Medication 25 MILLIGRAM(S): at 22:47

## 2017-04-12 RX ADMIN — HYDROMORPHONE HYDROCHLORIDE 6 MILLIGRAM(S): 2 INJECTION INTRAMUSCULAR; INTRAVENOUS; SUBCUTANEOUS at 14:10

## 2017-04-12 RX ADMIN — HYDROMORPHONE HYDROCHLORIDE 6 MILLIGRAM(S): 2 INJECTION INTRAMUSCULAR; INTRAVENOUS; SUBCUTANEOUS at 13:10

## 2017-04-12 RX ADMIN — OXYCODONE HYDROCHLORIDE 10 MILLIGRAM(S): 5 TABLET ORAL at 07:00

## 2017-04-12 RX ADMIN — HYDROMORPHONE HYDROCHLORIDE 6 MILLIGRAM(S): 2 INJECTION INTRAMUSCULAR; INTRAVENOUS; SUBCUTANEOUS at 17:30

## 2017-04-12 RX ADMIN — Medication 1 TABLET(S): at 11:49

## 2017-04-12 RX ADMIN — OXYCODONE HYDROCHLORIDE 10 MILLIGRAM(S): 5 TABLET ORAL at 22:47

## 2017-04-12 RX ADMIN — Medication 100 MILLIGRAM(S): at 05:09

## 2017-04-12 RX ADMIN — HYDROMORPHONE HYDROCHLORIDE 1 MILLIGRAM(S): 2 INJECTION INTRAMUSCULAR; INTRAVENOUS; SUBCUTANEOUS at 21:10

## 2017-04-12 RX ADMIN — Medication 25 MILLIGRAM(S): at 15:00

## 2017-04-12 RX ADMIN — HYDROMORPHONE HYDROCHLORIDE 6 MILLIGRAM(S): 2 INJECTION INTRAMUSCULAR; INTRAVENOUS; SUBCUTANEOUS at 06:00

## 2017-04-12 RX ADMIN — SENNA PLUS 2 TABLET(S): 8.6 TABLET ORAL at 22:48

## 2017-04-12 RX ADMIN — Medication 100 MILLIGRAM(S): at 15:00

## 2017-04-12 RX ADMIN — HYDROMORPHONE HYDROCHLORIDE 1 MILLIGRAM(S): 2 INJECTION INTRAMUSCULAR; INTRAVENOUS; SUBCUTANEOUS at 10:10

## 2017-04-12 RX ADMIN — HYDROMORPHONE HYDROCHLORIDE 1 MILLIGRAM(S): 2 INJECTION INTRAMUSCULAR; INTRAVENOUS; SUBCUTANEOUS at 20:55

## 2017-04-12 RX ADMIN — Medication 100 MILLIGRAM(S): at 22:47

## 2017-04-12 RX ADMIN — HYDROMORPHONE HYDROCHLORIDE 1 MILLIGRAM(S): 2 INJECTION INTRAMUSCULAR; INTRAVENOUS; SUBCUTANEOUS at 14:30

## 2017-04-12 RX ADMIN — OXYCODONE HYDROCHLORIDE 10 MILLIGRAM(S): 5 TABLET ORAL at 15:00

## 2017-04-12 RX ADMIN — HYDROMORPHONE HYDROCHLORIDE 6 MILLIGRAM(S): 2 INJECTION INTRAMUSCULAR; INTRAVENOUS; SUBCUTANEOUS at 10:10

## 2017-04-12 RX ADMIN — Medication 600 MILLIGRAM(S): at 10:10

## 2017-04-12 RX ADMIN — Medication 325 MILLIGRAM(S): at 05:09

## 2017-04-12 RX ADMIN — Medication 600 MILLIGRAM(S): at 10:40

## 2017-04-12 RX ADMIN — HYDROMORPHONE HYDROCHLORIDE 1 MILLIGRAM(S): 2 INJECTION INTRAMUSCULAR; INTRAVENOUS; SUBCUTANEOUS at 11:10

## 2017-04-12 RX ADMIN — HYDROMORPHONE HYDROCHLORIDE 6 MILLIGRAM(S): 2 INJECTION INTRAMUSCULAR; INTRAVENOUS; SUBCUTANEOUS at 09:10

## 2017-04-12 RX ADMIN — MAGNESIUM HYDROXIDE 30 MILLILITER(S): 400 TABLET, CHEWABLE ORAL at 07:00

## 2017-04-12 RX ADMIN — ATORVASTATIN CALCIUM 20 MILLIGRAM(S): 80 TABLET, FILM COATED ORAL at 22:47

## 2017-04-12 RX ADMIN — HYDROMORPHONE HYDROCHLORIDE 1 MILLIGRAM(S): 2 INJECTION INTRAMUSCULAR; INTRAVENOUS; SUBCUTANEOUS at 01:37

## 2017-04-12 RX ADMIN — Medication 325 MILLIGRAM(S): at 17:27

## 2017-04-12 RX ADMIN — HYDROMORPHONE HYDROCHLORIDE 1 MILLIGRAM(S): 2 INJECTION INTRAMUSCULAR; INTRAVENOUS; SUBCUTANEOUS at 06:19

## 2017-04-12 RX ADMIN — OXYCODONE HYDROCHLORIDE 10 MILLIGRAM(S): 5 TABLET ORAL at 07:55

## 2017-04-12 NOTE — DIETITIAN INITIAL EVALUATION ADULT. - NS AS NUTRI INTERV ED CONTENT
Nutrition relationship to health/disease/Recommended modifications/Purpose of the nutrition education/Priority modifications/Survival information

## 2017-04-12 NOTE — PROGRESS NOTE ADULT - SUBJECTIVE AND OBJECTIVE BOX
Pt. seen at 0842  Pain Management Progress Note - [facility] [phone]    HPI:  Pt. continues to complain of right knee pain.  States po dilaudid is helping "a little".            Pertinent PMH: Pain at: ___Back ___Neck_x__Knee ___Hip ___Shoulder _x__ Opioid tolerance    Pain is _x__ sharp ____dull ___burning ___achy ___ Intensity: ____ mild ____mod ____severe     Location __x___surgical site _____cervical _____lumbar ____abd _____upper ext__x__lower ext    Worse with __x__activity __x__movement _____physical therapy___ Rest    Improved with _x___medication __x__rest ____physical therapy    lactated ringers.  aspirin enteric coated  aluminum hydroxide/magnesium hydroxide/simethicone Suspension  pantoprazole    Tablet  magnesium hydroxide Suspension  polyethylene glycol 3350  bisacodyl Suppository  senna  docusate sodium  ferrous    sulfate  multivitamin  insulin lispro (HumaLOG) corrective regimen sliding scale  dextrose 5%.  dextrose Gel  dextrose 50% Injectable  dextrose 50% Injectable  dextrose 50% Injectable  glucagon  Injectable  atorvastatin  zolpidem  ATENolol  Tablet  hydrochlorothiazide   Tablet  naloxone Injectable  ondansetron Injectable  pregabalin  acetaminophen   Tablet  HYDROmorphone   Tablet  HYDROmorphone  Injectable  oxyCODONE ER Tablet  HYDROmorphone   Tablet  ibuprofen  Tablet      ROS: Const:  ___febrile   Eyes:___ENT:___CV: ___chest pain  Resp: ____sob  GI:___nausea ___vomiting ____abd pain ___npo ___clears _+__full diet __bm  :___ [place]: __+_pain ___spasm  Skin:___ Neuro:  __-_qrzebdts__-_mabzhlwer____ numbness ___weakness ___paresthesia  Psych:___anxiety  Endo:___ Heme:___Allergy:___          Hemoglobin: 10.9 g/dL (04-11 @ 06:20)        T(C): 36.9, Max: 37.3 (04-12 @ 00:40)  HR: 77 (75 - 84)  BP: 129/87 (116/74 - 129/87)  RR: 16 (16 - 17)  SpO2: 98% (96% - 99%)  Wt(kg): --     PHYSICAL EXAM:  Gen Appearance: _x__no acute distress _x__appropriate         Neuro: ___SILT feet_x___ EOM Intact Psych: AAOX_3_, _x__mood/affect appropriate        Eyes: __x_conjunctiva WNL  __x___ Pupils equal and round        ENT: _x__ears and nose atraumatic__x_ Hearing grossly intact        Neck: ___trachea midline, no visible masses ___thyroid without palpable mass    Resp: _x__Nml WOB____No tactile fremitus ___clear to auscultation    Cardio: ___extremities free from edema ____pedal pulses palpable    GI/Abdomen: ___soft _____ Nontender______Nondistended_____HSM    Lymphatic: ___no palpable nodes in neck  ___no palpable nodes calves and feet    Skin/Wound: ___Incision, ___Dressing c/d/i,   ____surrounding tissues soft,  ___drain/chest tube present____    Muscular: EHL ___/5  Gastrocnemius___/5    _x__absent clubbing/cyanosis         ASSESSMENT:  This is a 52y old Female with a history of:  M17.11  Family history of malignant neoplasm (Mother)  Family history of ischemic heart disease (Father)  Thyrotoxicosis  Hyperparathyroidism  Type 2 diabetes mellitus  Nontraumatic extradural hemorrhage  Essential hypertension  Primary osteoarthritis of right knee  Anemia due to blood loss  Hypovitaminosis D  COPD, mild  Hyperparathyroidism  Total knee arthroplasty  History of breast surgery  History of total knee replacement  opioid dependence, right knee pain and is S/P R total knee replacement today and continues to complain of right knee pain.      Recommended Treatment PLAN:  1.  Add Ibuprofen 600 mg po q6h prn  2.  Oxycontin 10 mg po q8h  3.  Dilaudid 4-6 mg po q4h prn  4.  Dilaudid 1 mg IV q4h prn  5.  Lyrica 25 mg po tid

## 2017-04-12 NOTE — DIETITIAN INITIAL EVALUATION ADULT. - OTHER INFO
53y/o F s/p R TKR. Pt seen resting in bed; c/o severe knee pain which is not being controlled with meds- RN aware. She reports a fluctuating appetite due to pain, but endorses improvements. Denies N/V/D and mechanical issues. No BM x5 days per pt, but denies feeling C and bloated. Continue bowel regimen and encouraged adequate fluid/fiber intake. PTA pt reports following a regular diet despite DM. Provided with diet education and encouraged limiting concentrated sugar sources and Na. Will follow.

## 2017-04-12 NOTE — DIETITIAN INITIAL EVALUATION ADULT. - PERTINENT MEDS FT
aspirin, LR, D5, D50, dex gel, glucagon, SSI, dilaudid, oxycontin, dulcolax, colace, lopressor, ferrous sulfate, hydrochlorothiazide, MVI, zofran, protonix, miralax, senna

## 2017-04-12 NOTE — PROGRESS NOTE ADULT - SUBJECTIVE AND OBJECTIVE BOX
HPI:  52 year old female with osteoarthritis right knee with moderate knee pain, deformity, decreased ROM and unsteady gait progressively increased for years. MRI confirmed diagnosis.  Symptoms worse with stairs and after prolonged imobility.  Patient day #5 post op right TKR with moderate knee pain and malaise.  Patient was seen in cardiac consult for control of BP.  Awaiting transfer to [facility].    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 1000milliLiter(s) IV Continuous <Continuous>  aspirin enteric coated 325milliGRAM(s) Oral two times a day  pantoprazole    Tablet 40milliGRAM(s) Oral daily  polyethylene glycol 3350 17Gram(s) Oral daily  docusate sodium 100milliGRAM(s) Oral three times a day  ferrous    sulfate 325milliGRAM(s) Oral three times a day with meals  multivitamin 1Tablet(s) Oral daily  insulin lispro (HumaLOG) corrective regimen sliding scale  SubCutaneous Before meals and at bedtime  dextrose 5%. 1000milliLiter(s) IV Continuous <Continuous>  dextrose 50% Injectable 12.5Gram(s) IV Push once  dextrose 50% Injectable 25Gram(s) IV Push once  dextrose 50% Injectable 25Gram(s) IV Push once  BUpivacaine liposome 1.3% Injectable (no eMAR) 20milliLiter(s) Local Injection once  atorvastatin 20milliGRAM(s) Oral at bedtime  ATENolol  Tablet 50milliGRAM(s) Oral two times a day  hydrochlorothiazide   Tablet 50milliGRAM(s) Oral daily  pregabalin 25milliGRAM(s) Oral three times a day  oxyCODONE ER Tablet 10milliGRAM(s) Oral every 8 hours    MEDICATIONS  (PRN):  aluminum hydroxide/magnesium hydroxide/simethicone Suspension 30milliLiter(s) Oral four times a day PRN Indigestion  magnesium hydroxide Suspension 30milliLiter(s) Oral daily PRN Constipation  bisacodyl Suppository 10milliGRAM(s) Rectal daily PRN If no bowel movement by postoperative day #2  senna 2Tablet(s) Oral at bedtime PRN Constipation  dextrose Gel 1Dose(s) Oral once PRN Blood Glucose LESS THAN 70 milliGRAM(s)/deciliter  glucagon  Injectable 1milliGRAM(s) IntraMuscular once PRN Glucose LESS THAN 70 milligrams/deciliter  zolpidem 5milliGRAM(s) Oral at bedtime PRN Insomnia  naloxone Injectable 0.1milliGRAM(s) IV Push every 3 minutes PRN For ANY of the following changes in patient status:  A. RR LESS THAN 10 breaths per minute, B. Oxygen saturation LESS THAN 90%, C. Sedation score of 6  ondansetron Injectable 4milliGRAM(s) IV Push every 6 hours PRN Nausea  acetaminophen   Tablet 650milliGRAM(s) Oral every 6 hours PRN For Temp greater than 38 C (100.4 F)  HYDROmorphone   Tablet 4milliGRAM(s) Oral every 4 hours PRN Moderate Pain (4 - 6)  HYDROmorphone  Injectable 1milliGRAM(s) IV Push every 4 hours PRN breakthrough Severe Pain (7 - 10)  HYDROmorphone   Tablet 6milliGRAM(s) Oral every 4 hours PRN Severe Pain (7 - 10)      Allergies    No Known Allergies    Intolerances    OxyContin (Pruritus)      REVIEW OF SYSTEMS    General:  malaise	  Skin/Breast: normal  Ophthalmologic: negative  ENMT:	normal  Respiratory and Thorax: normal  Cardiovascular:	normal  Gastrointestinal:	normal  Genitourinary:	normal  Musculoskeletal: right knee swelling   Neurological:	normal  Psychiatric:	normal  Hematology/Lymphatics:	negative  Endocrine:	negative  Allergic/Immunologic:	negative      PHYSICAL EXAM:    Vital Signs Last 24 Hrs  T(C): 36.1, Max: 37.3 (04-12 @ 00:40)  T(F): 97, Max: 99.2 (04-12 @ 00:40)  HR: 80 (75 - 84)  BP: 116/74 (110/72 - 124/78)  BP(mean): --  RR: 17 (16 - 17)  SpO2: 99% (96% - 99%)    Constitutional: WDWNF in NAD  Eyes: conj pale  ENMT: negative  Neck: supple  Breasts: not examined   Back: negative  Respiratory: clear to P&A  Cardiovascular: no MRGT or H  Gastrointestinal: normal bowel sounds  Genitourinary: neg  Rectal: not examined  Extremities: edema right leg  Vascular: normal  Neurological: normal  Skin: negative  Lymph Nodes: negative  Musculoskeletal:   decreased ROM right knee    Psychiatric: anxiety                         10.9   8.0   )-----------( 407      ( 11 Apr 2017 06:20 )             32.8       04-11    133<L>  |  96  |  26<H>  ----------------------------<  165<H>  3.5   |  29  |  0.98    Ca    11.1<H>      11 Apr 2017 06:19

## 2017-04-12 NOTE — PROGRESS NOTE ADULT - SUBJECTIVE AND OBJECTIVE BOX
S: Pain present but less well controlled after more progress with PT yesterday.  Ambulated into the hallway.  Urinating well, passed flatus.  Denies F/C/N/V/SOB/CP.    O:  Afeb, BP 110s/70s  Dressing c/d/i and acceptable postop swelling  EHL/FHL 5/5  SILT in sural/saphenous/peroneals/tibial n  Pedal pulses 2+/4  Calves supple, NTTP, negative Partha's

## 2017-04-12 NOTE — PROGRESS NOTE ADULT - PROBLEM SELECTOR PLAN 1
Discussed with house staff and nursing staff.  Pain management, PT, incentive spirometer, DVT prophylaxis, repeat labs and discharge planning to [facility].

## 2017-04-13 VITALS
DIASTOLIC BLOOD PRESSURE: 69 MMHG | TEMPERATURE: 96 F | RESPIRATION RATE: 17 BRPM | OXYGEN SATURATION: 98 % | SYSTOLIC BLOOD PRESSURE: 121 MMHG | HEART RATE: 71 BPM

## 2017-04-13 LAB
ANION GAP SERPL CALC-SCNC: 8 MMOL/L — LOW (ref 9–16)
BUN SERPL-MCNC: 15 MG/DL — SIGNIFICANT CHANGE UP (ref 7–23)
CALCIUM SERPL-MCNC: 11.2 MG/DL — HIGH (ref 8.5–10.5)
CHLORIDE SERPL-SCNC: 96 MMOL/L — SIGNIFICANT CHANGE UP (ref 96–108)
CO2 SERPL-SCNC: 29 MMOL/L — SIGNIFICANT CHANGE UP (ref 22–31)
CREAT SERPL-MCNC: 0.76 MG/DL — SIGNIFICANT CHANGE UP (ref 0.5–1.3)
GLUCOSE SERPL-MCNC: 167 MG/DL — HIGH (ref 70–99)
HCT VFR BLD CALC: 32 % — LOW (ref 34.5–45)
HGB BLD-MCNC: 10.6 G/DL — LOW (ref 11.5–15.5)
MCHC RBC-ENTMCNC: 27.3 PG — SIGNIFICANT CHANGE UP (ref 27–34)
MCHC RBC-ENTMCNC: 33.1 G/DL — SIGNIFICANT CHANGE UP (ref 32–36)
MCV RBC AUTO: 82.5 FL — SIGNIFICANT CHANGE UP (ref 80–100)
PLATELET # BLD AUTO: 475 K/UL — HIGH (ref 150–400)
POTASSIUM SERPL-MCNC: 3.7 MMOL/L — SIGNIFICANT CHANGE UP (ref 3.5–5.3)
POTASSIUM SERPL-SCNC: 3.7 MMOL/L — SIGNIFICANT CHANGE UP (ref 3.5–5.3)
RBC # BLD: 3.88 M/UL — SIGNIFICANT CHANGE UP (ref 3.8–5.2)
RBC # FLD: 14.9 % — SIGNIFICANT CHANGE UP (ref 10.3–16.9)
SODIUM SERPL-SCNC: 133 MMOL/L — LOW (ref 135–145)
WBC # BLD: 10.1 K/UL — SIGNIFICANT CHANGE UP (ref 3.8–10.5)
WBC # FLD AUTO: 10.1 K/UL — SIGNIFICANT CHANGE UP (ref 3.8–10.5)

## 2017-04-13 PROCEDURE — 73560 X-RAY EXAM OF KNEE 1 OR 2: CPT

## 2017-04-13 PROCEDURE — 80048 BASIC METABOLIC PNL TOTAL CA: CPT

## 2017-04-13 PROCEDURE — 87641 MR-STAPH DNA AMP PROBE: CPT

## 2017-04-13 PROCEDURE — 85027 COMPLETE CBC AUTOMATED: CPT

## 2017-04-13 PROCEDURE — 97162 PT EVAL MOD COMPLEX 30 MIN: CPT

## 2017-04-13 PROCEDURE — 85025 COMPLETE CBC W/AUTO DIFF WBC: CPT

## 2017-04-13 PROCEDURE — C1713: CPT

## 2017-04-13 PROCEDURE — C1776: CPT

## 2017-04-13 PROCEDURE — 36415 COLL VENOUS BLD VENIPUNCTURE: CPT

## 2017-04-13 PROCEDURE — 97116 GAIT TRAINING THERAPY: CPT

## 2017-04-13 PROCEDURE — 83036 HEMOGLOBIN GLYCOSYLATED A1C: CPT

## 2017-04-13 PROCEDURE — 88300 SURGICAL PATH GROSS: CPT

## 2017-04-13 RX ORDER — IBUPROFEN 200 MG
1 TABLET ORAL
Qty: 0 | Refills: 0 | COMMUNITY
Start: 2017-04-13

## 2017-04-13 RX ORDER — LACTULOSE 10 G/15ML
15 SOLUTION ORAL
Qty: 0 | Refills: 0 | COMMUNITY
Start: 2017-04-13

## 2017-04-13 RX ORDER — ACETAMINOPHEN 500 MG
2 TABLET ORAL
Qty: 0 | Refills: 0 | COMMUNITY
Start: 2017-04-13

## 2017-04-13 RX ORDER — LACTULOSE 10 G/15ML
10 SOLUTION ORAL DAILY
Qty: 0 | Refills: 0 | Status: DISCONTINUED | OUTPATIENT
Start: 2017-04-13 | End: 2017-04-13

## 2017-04-13 RX ORDER — INSULIN LISPRO 100/ML
0 VIAL (ML) SUBCUTANEOUS
Qty: 0 | Refills: 0 | COMMUNITY
Start: 2017-04-13

## 2017-04-13 RX ORDER — IBUPROFEN 200 MG
600 TABLET ORAL EVERY 6 HOURS
Qty: 0 | Refills: 0 | Status: DISCONTINUED | OUTPATIENT
Start: 2017-04-13 | End: 2017-04-13

## 2017-04-13 RX ORDER — HYDROMORPHONE HYDROCHLORIDE 2 MG/ML
1 INJECTION INTRAMUSCULAR; INTRAVENOUS; SUBCUTANEOUS
Qty: 0 | Refills: 0 | COMMUNITY
Start: 2017-04-13

## 2017-04-13 RX ORDER — PANTOPRAZOLE SODIUM 20 MG/1
1 TABLET, DELAYED RELEASE ORAL
Qty: 0 | Refills: 0 | COMMUNITY
Start: 2017-04-13

## 2017-04-13 RX ORDER — METHADONE HYDROCHLORIDE 40 MG/1
0 TABLET ORAL
Qty: 0 | Refills: 0 | COMMUNITY

## 2017-04-13 RX ORDER — HYDROMORPHONE HYDROCHLORIDE 2 MG/ML
3 INJECTION INTRAMUSCULAR; INTRAVENOUS; SUBCUTANEOUS
Qty: 0 | Refills: 0 | COMMUNITY
Start: 2017-04-13

## 2017-04-13 RX ORDER — OXYCODONE HYDROCHLORIDE 5 MG/1
1 TABLET ORAL
Qty: 0 | Refills: 0 | COMMUNITY
Start: 2017-04-13

## 2017-04-13 RX ADMIN — HYDROMORPHONE HYDROCHLORIDE 1 MILLIGRAM(S): 2 INJECTION INTRAMUSCULAR; INTRAVENOUS; SUBCUTANEOUS at 01:00

## 2017-04-13 RX ADMIN — HYDROMORPHONE HYDROCHLORIDE 1 MILLIGRAM(S): 2 INJECTION INTRAMUSCULAR; INTRAVENOUS; SUBCUTANEOUS at 12:30

## 2017-04-13 RX ADMIN — Medication 325 MILLIGRAM(S): at 05:43

## 2017-04-13 RX ADMIN — Medication 1: at 07:42

## 2017-04-13 RX ADMIN — HYDROMORPHONE HYDROCHLORIDE 6 MILLIGRAM(S): 2 INJECTION INTRAMUSCULAR; INTRAVENOUS; SUBCUTANEOUS at 05:44

## 2017-04-13 RX ADMIN — Medication 25 MILLIGRAM(S): at 05:43

## 2017-04-13 RX ADMIN — HYDROMORPHONE HYDROCHLORIDE 1 MILLIGRAM(S): 2 INJECTION INTRAMUSCULAR; INTRAVENOUS; SUBCUTANEOUS at 12:14

## 2017-04-13 RX ADMIN — POLYETHYLENE GLYCOL 3350 17 GRAM(S): 17 POWDER, FOR SOLUTION ORAL at 12:14

## 2017-04-13 RX ADMIN — HYDROMORPHONE HYDROCHLORIDE 6 MILLIGRAM(S): 2 INJECTION INTRAMUSCULAR; INTRAVENOUS; SUBCUTANEOUS at 15:24

## 2017-04-13 RX ADMIN — LACTULOSE 10 GRAM(S): 10 SOLUTION ORAL at 09:43

## 2017-04-13 RX ADMIN — Medication 50 MILLIGRAM(S): at 17:36

## 2017-04-13 RX ADMIN — Medication 325 MILLIGRAM(S): at 17:36

## 2017-04-13 RX ADMIN — Medication 600 MILLIGRAM(S): at 17:36

## 2017-04-13 RX ADMIN — Medication 100 MILLIGRAM(S): at 05:43

## 2017-04-13 RX ADMIN — OXYCODONE HYDROCHLORIDE 10 MILLIGRAM(S): 5 TABLET ORAL at 08:18

## 2017-04-13 RX ADMIN — SENNA PLUS 2 TABLET(S): 8.6 TABLET ORAL at 17:39

## 2017-04-13 RX ADMIN — HYDROMORPHONE HYDROCHLORIDE 4 MILLIGRAM(S): 2 INJECTION INTRAMUSCULAR; INTRAVENOUS; SUBCUTANEOUS at 09:43

## 2017-04-13 RX ADMIN — HYDROMORPHONE HYDROCHLORIDE 1 MILLIGRAM(S): 2 INJECTION INTRAMUSCULAR; INTRAVENOUS; SUBCUTANEOUS at 07:00

## 2017-04-13 RX ADMIN — Medication 100 MILLIGRAM(S): at 12:14

## 2017-04-13 RX ADMIN — Medication 600 MILLIGRAM(S): at 09:41

## 2017-04-13 RX ADMIN — Medication 1 TABLET(S): at 12:14

## 2017-04-13 RX ADMIN — PANTOPRAZOLE SODIUM 40 MILLIGRAM(S): 20 TABLET, DELAYED RELEASE ORAL at 12:14

## 2017-04-13 RX ADMIN — HYDROMORPHONE HYDROCHLORIDE 6 MILLIGRAM(S): 2 INJECTION INTRAMUSCULAR; INTRAVENOUS; SUBCUTANEOUS at 16:24

## 2017-04-13 RX ADMIN — HYDROMORPHONE HYDROCHLORIDE 1 MILLIGRAM(S): 2 INJECTION INTRAMUSCULAR; INTRAVENOUS; SUBCUTANEOUS at 07:15

## 2017-04-13 RX ADMIN — HYDROMORPHONE HYDROCHLORIDE 6 MILLIGRAM(S): 2 INJECTION INTRAMUSCULAR; INTRAVENOUS; SUBCUTANEOUS at 10:43

## 2017-04-13 RX ADMIN — ATENOLOL 50 MILLIGRAM(S): 25 TABLET ORAL at 05:44

## 2017-04-13 RX ADMIN — OXYCODONE HYDROCHLORIDE 10 MILLIGRAM(S): 5 TABLET ORAL at 14:24

## 2017-04-13 RX ADMIN — HYDROMORPHONE HYDROCHLORIDE 6 MILLIGRAM(S): 2 INJECTION INTRAMUSCULAR; INTRAVENOUS; SUBCUTANEOUS at 06:44

## 2017-04-13 RX ADMIN — OXYCODONE HYDROCHLORIDE 10 MILLIGRAM(S): 5 TABLET ORAL at 07:41

## 2017-04-13 RX ADMIN — HYDROMORPHONE HYDROCHLORIDE 1 MILLIGRAM(S): 2 INJECTION INTRAMUSCULAR; INTRAVENOUS; SUBCUTANEOUS at 01:15

## 2017-04-13 NOTE — PROGRESS NOTE ADULT - PROBLEM SELECTOR PLAN 1
Discussed with house staff and nursing staff.  Pain management, PT, incentive spirometer, DVT prophylaxis, repeat labs and discharge planning. VIC

## 2017-04-13 NOTE — PROGRESS NOTE ADULT - SUBJECTIVE AND OBJECTIVE BOX
S: Patient seen at bedside, sitting up in bed, c/o 10/10 pain.  No acute issues overnight.  Denies F/C/N/V/SOB/CP.    O:  Afeb, BP 110s/70s  Dressing c/d/i and mild LE edema, no errythema  EHL/FHL 5/5  SILT in sural/saphenous/peroneals/tibial n  Pedal pulses 2+/4  Calves supple, NTTP, negative Partha's

## 2017-04-13 NOTE — PROGRESS NOTE ADULT - SUBJECTIVE AND OBJECTIVE BOX
Pt. seen at 0824  Pain Management Progress Note - [facility] [phone]    HPI:  Pt. continues to complain of pain in the right knee.  States opiates are helping very little.  Started prn ibuprofen yesterday and used minimally.            Pertinent PMH: Pain at: ___Back ___Neck_x__Knee ___Hip ___Shoulder ___ Opioid tolerance    Pain is _x__ sharp ____dull ___burning _x__achy ___ Intensity: ____ mild ____mod ____severe     Location __x___surgical site _____cervical _____lumbar ____abd _____upper ext___x_lower ext    Worse with _x__activity __x__movement _____physical therapy___ Rest    Improved with __x__medication __x__rest ____physical therapy    lactated ringers.  aspirin enteric coated  aluminum hydroxide/magnesium hydroxide/simethicone Suspension  pantoprazole    Tablet  magnesium hydroxide Suspension  polyethylene glycol 3350  bisacodyl Suppository  senna  docusate sodium  ferrous    sulfate  multivitamin  insulin lispro (HumaLOG) corrective regimen sliding scale  dextrose 5%.  dextrose Gel  dextrose 50% Injectable  dextrose 50% Injectable  dextrose 50% Injectable  glucagon  Injectable  atorvastatin  zolpidem  ATENolol  Tablet  hydrochlorothiazide   Tablet  naloxone Injectable  ondansetron Injectable  acetaminophen   Tablet  HYDROmorphone   Tablet  HYDROmorphone  Injectable  oxyCODONE ER Tablet  HYDROmorphone   Tablet  lactulose Syrup  bisacodyl Suppository  ibuprofen  Tablet  pregabalin      ROS: Const:  ___febrile   Eyes:___ENT:___CV: ___chest pain  Resp: ____sob  GI:_-__nausea _-__vomiting _-___abd pain ___npo ___clears _+__full diet __bm  :___ Musk: _+__pain ___spasm  Skin:___ Neuro:  _-__koisuslj__-_tmtumsxwn____ numbness ___weakness ___paresthesia  Psych:___anxiety  Endo:___ Heme:___Allergy:___                T(C): 37.5, Max: 37.5 (04-13 @ 08:54)  HR: 73 (68 - 81)  BP: 135/85 (116/72 - 147/75)  RR: 16 (16 - 16)  SpO2: 98% (98% - 100%)  Wt(kg): --     PHYSICAL EXAM:  Gen Appearance: _x_no acute distress _x__appropriate         Neuro: __x_SILT feet__x__ EOM Intact Psych: AAOX_3_, __x_mood/affect appropriate        Eyes: __x_conjunctiva WNL  ___x__ Pupils equal and round        ENT: __x_ears and nose atraumatic_x__ Hearing grossly intact        Neck: ___trachea midline, no visible masses ___thyroid without palpable mass    Resp: _x__Nml WOB____No tactile fremitus ___clear to auscultation    Cardio: ___extremities free from edema ____pedal pulses palpable    GI/Abdomen: ___soft _____ Nontender______Nondistended_____HSM    Lymphatic: ___no palpable nodes in neck  ___no palpable nodes calves and feet    Skin/Wound: ___Incision, _x__Dressing c/d/i,   ____surrounding tissues soft,  ___drain/chest tube present____    Muscular: EHL ___/5  Gastrocnemius___/5    _x__absent clubbing/cyanosis         ASSESSMENT:  This is a 52y old Female with a history of:  M17.11  Family history of malignant neoplasm (Mother)  Family history of ischemic heart disease (Father)  Thyrotoxicosis  Hyperparathyroidism  Type 2 diabetes mellitus  Nontraumatic extradural hemorrhage  Essential hypertension  Primary osteoarthritis of right knee  Anemia due to blood loss  Hypovitaminosis D  COPD, mild  Total knee arthroplasty  History of breast surgery  History of total knee replacement  and right knee pain S/P R TKR and continues to complain of pain.  Pt. states prior to surgery she was on opiates and they were also not helpful then.        Recommended Treatment PLAN:  1.  Change ibuprofen to 600 mg po q6h standing  2.  Oxycontin 10 mg po q8h  3.  Dilaudid 4-6 mg po q4h prn  4.  Increase lyrica to 50 mg BID  5.  Dilaudid 1 mg IV q3h prn

## 2017-04-13 NOTE — PROGRESS NOTE ADULT - SUBJECTIVE AND OBJECTIVE BOX
HPI:  52 year old female with osteoarthritis right knee with moderate knee pain, deformity, decreased ROM and unsteady gait progressively increased for years. MRI confirmed diagnosis.  Symptoms worse with stairs and after prolonged imobility.  Patient day #6 post op right TKR with moderate knee pain and malaise.  Patient was seen in cardiac consult for control of BP.  Awaiting transfer to [facility].    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  Essential hypertension: Hypertension  History of breast surgery: bilateral- / 2016  Other postprocedural status: S/P spinal surgery  History of total knee replacement: S/P total knee replacement      MEDICATIONS  (STANDING):  lactated ringers. 1000milliLiter(s) IV Continuous <Continuous>  aspirin enteric coated 325milliGRAM(s) Oral two times a day  pantoprazole    Tablet 40milliGRAM(s) Oral daily  polyethylene glycol 3350 17Gram(s) Oral daily  docusate sodium 100milliGRAM(s) Oral three times a day  ferrous    sulfate 325milliGRAM(s) Oral three times a day with meals  multivitamin 1Tablet(s) Oral daily  insulin lispro (HumaLOG) corrective regimen sliding scale  SubCutaneous Before meals and at bedtime  dextrose 5%. 1000milliLiter(s) IV Continuous <Continuous>  dextrose 50% Injectable 12.5Gram(s) IV Push once  dextrose 50% Injectable 25Gram(s) IV Push once  dextrose 50% Injectable 25Gram(s) IV Push once  BUpivacaine liposome 1.3% Injectable (no eMAR) 20milliLiter(s) Local Injection once  atorvastatin 20milliGRAM(s) Oral at bedtime  ATENolol  Tablet 50milliGRAM(s) Oral two times a day  hydrochlorothiazide   Tablet 50milliGRAM(s) Oral daily  pregabalin 25milliGRAM(s) Oral three times a day  oxyCODONE ER Tablet 10milliGRAM(s) Oral every 8 hours    MEDICATIONS  (PRN):  aluminum hydroxide/magnesium hydroxide/simethicone Suspension 30milliLiter(s) Oral four times a day PRN Indigestion  magnesium hydroxide Suspension 30milliLiter(s) Oral daily PRN Constipation  bisacodyl Suppository 10milliGRAM(s) Rectal daily PRN If no bowel movement by postoperative day #2  senna 2Tablet(s) Oral at bedtime PRN Constipation  dextrose Gel 1Dose(s) Oral once PRN Blood Glucose LESS THAN 70 milliGRAM(s)/deciliter  glucagon  Injectable 1milliGRAM(s) IntraMuscular once PRN Glucose LESS THAN 70 milligrams/deciliter  zolpidem 5milliGRAM(s) Oral at bedtime PRN Insomnia  naloxone Injectable 0.1milliGRAM(s) IV Push every 3 minutes PRN For ANY of the following changes in patient status:  A. RR LESS THAN 10 breaths per minute, B. Oxygen saturation LESS THAN 90%, C. Sedation score of 6  ondansetron Injectable 4milliGRAM(s) IV Push every 6 hours PRN Nausea  acetaminophen   Tablet 650milliGRAM(s) Oral every 6 hours PRN For Temp greater than 38 C (100.4 F)  HYDROmorphone   Tablet 4milliGRAM(s) Oral every 4 hours PRN Moderate Pain (4 - 6)  HYDROmorphone  Injectable 1milliGRAM(s) IV Push every 4 hours PRN breakthrough Severe Pain (7 - 10)  HYDROmorphone   Tablet 6milliGRAM(s) Oral every 4 hours PRN Severe Pain (7 - 10)  ibuprofen  Tablet 600milliGRAM(s) Oral every 6 hours PRN mild pain      Allergies    No Known Allergies    Intolerances    OxyContin (Pruritus)      REVIEW OF SYSTEMS    General:  malaise	  Skin/Breast: normal  Ophthalmologic: negative  ENMT:	normal  Respiratory and Thorax: normal  Cardiovascular:	normal  Gastrointestinal:	normal  Genitourinary:	normal  Musculoskeletal: right knee swelling   Neurological:	normal  Psychiatric:	normal  Hematology/Lymphatics:	negative  Endocrine:	negative  Allergic/Immunologic:	negative      PHYSICAL EXAM:  Daily Weight in k.8 (2017 14:47)    Vital Signs Last 24 Hrs  T(C): 36.6, Max: 36.9 (-12 @ 08:39)  T(F): 97.8, Max: 98.4 (-12 @ 08:39)  HR: 81 (68 - 81)  BP: 123/77 (116/72 - 147/75)  BP(mean): --  RR: 16 (16 - 16)  SpO2: 98% (98% - 100%)    Constitutional: WDWNF in NAD  Eyes: conj pale  ENMT: negative  Neck: supple  Breasts: not examined   Back: negative  Respiratory: clear to P&A  Cardiovascular: no MRGT or H  Gastrointestinal: normal bowel sounds  Genitourinary: neg  Rectal: not examined  Extremities: edema right leg  Vascular: normal  Neurological: normal  Skin: negative  Lymph Nodes: negative  Musculoskeletal:   decreased ROM  right knee  Psychiatric: anxiety                        10.9   8.0   )-----------( 407      ( 2017 06:20 )             32.8       04-11    133<L>  |  96  |  26<H>  ----------------------------<  165<H>  3.5   |  29  |  0.98    Ca    11.1<H>      2017 06:19

## 2017-04-13 NOTE — PROGRESS NOTE ADULT - ASSESSMENT
1.  R knee DJD s/p R TKA POD#5  -Continue pain control per pain management  -ASA and SCDs for DVT prophylaxis  -Continue PT with WBAT  -HTN much improved, management per cardiology   -Bowel program  -Disposition:  ortho and medically stable for DC to [facility]
1.  R knee DJD s/p R TKA POD#1  Continue pain control per pain management  ASA and SCDs for DVT prophylaxis  Continue PT with WBAT  Maintain dressing c/d/i  Disposition:  Home in 1-2 days    2.  Acute Blood Loss Anemia - asymptomatic  Monitor H/H    3.  Leukocytosis  Likely reactive from surgery, will monitor
1.  R knee DJD s/p R TKA POD#2  Continue pain control per pain management  ASA and SCDs for DVT prophylaxis  Continue PT with WBAT  Dressing changed  Disposition:  Home in 1-2 days    2.  Acute Blood Loss Anemia - asymptomatic  Monitor H/H    3.  Leukocytosis  Likely reactive from surgery, will monitor    4.  Hypertension  Will discuss with medicine for management    5.  Hypokalemia  Supplemented with 40 meq po x 1    6.  Hyponatremia  Will discuss with medicine team for management    7.  Pain  Pain management recommendations
1.  R knee DJD s/p R TKA POD#3  Continue pain control per pain management  ASA and SCDs for DVT prophylaxis  Continue PT with WBAT  HTN improving, management per cardiology   Disposition:  Possible [facility] today
1.  R knee DJD s/p R TKA POD#4  -Continue pain control per pain management  -ASA and SCDs for DVT prophylaxis  -Continue PT with WBAT  -HTN much improved, management per cardiology   -Bowel program  -Disposition:  ortho and medically stable for DC to [facility] TODAY
1.  R knee DJD s/p R TKA POD#6  -Continue pain control per pain management - appreciate new recs for better pain control  -ASA and SCDs for DVT prophylaxis  -Continue PT with WBAT  -HTN much improved, management per cardiology   -Bowel program  -Disposition:  ortho and medically stable for DC to [facility] today

## 2017-04-13 NOTE — PROGRESS NOTE ADULT - PROVIDER SPECIALTY LIST ADULT
Internal Medicine
Orthopedics
Pain Medicine
Orthopedics

## 2017-04-16 LAB — SURGICAL PATHOLOGY STUDY: SIGNIFICANT CHANGE UP

## 2017-04-17 DIAGNOSIS — E87.1 HYPO-OSMOLALITY AND HYPONATREMIA: ICD-10-CM

## 2017-04-17 DIAGNOSIS — M17.11 UNILATERAL PRIMARY OSTEOARTHRITIS, RIGHT KNEE: ICD-10-CM

## 2017-04-17 DIAGNOSIS — D62 ACUTE POSTHEMORRHAGIC ANEMIA: ICD-10-CM

## 2017-04-17 DIAGNOSIS — E55.9 VITAMIN D DEFICIENCY, UNSPECIFIED: ICD-10-CM

## 2017-04-17 DIAGNOSIS — Z87.891 PERSONAL HISTORY OF NICOTINE DEPENDENCE: ICD-10-CM

## 2017-04-17 DIAGNOSIS — E87.6 HYPOKALEMIA: ICD-10-CM

## 2017-04-17 DIAGNOSIS — I10 ESSENTIAL (PRIMARY) HYPERTENSION: ICD-10-CM

## 2017-04-17 DIAGNOSIS — E05.90 THYROTOXICOSIS, UNSPECIFIED WITHOUT THYROTOXIC CRISIS OR STORM: ICD-10-CM

## 2017-04-17 DIAGNOSIS — E66.9 OBESITY, UNSPECIFIED: ICD-10-CM

## 2017-04-17 DIAGNOSIS — E11.9 TYPE 2 DIABETES MELLITUS WITHOUT COMPLICATIONS: ICD-10-CM

## 2017-04-17 DIAGNOSIS — J44.9 CHRONIC OBSTRUCTIVE PULMONARY DISEASE, UNSPECIFIED: ICD-10-CM

## 2017-05-02 ENCOUNTER — APPOINTMENT (OUTPATIENT)
Dept: ORTHOPEDIC SURGERY | Facility: CLINIC | Age: 52
End: 2017-05-02

## 2017-05-02 VITALS — BODY MASS INDEX: 32.99 KG/M2 | WEIGHT: 198 LBS | HEIGHT: 65 IN

## 2017-05-02 DIAGNOSIS — Z96.651 AFTERCARE FOLLOWING JOINT REPLACEMENT SURGERY: ICD-10-CM

## 2017-05-02 DIAGNOSIS — Z47.1 AFTERCARE FOLLOWING JOINT REPLACEMENT SURGERY: ICD-10-CM

## 2017-06-01 ENCOUNTER — APPOINTMENT (OUTPATIENT)
Dept: ORTHOPEDIC SURGERY | Facility: CLINIC | Age: 52
End: 2017-06-01

## 2020-07-07 NOTE — PRE-OP CHECKLIST - SELECT TESTS ORDERED
Progress Note    SUBJECTIVE:   Pt comfortable; +void; +flatus; +ambulation    OBJECTIVE:    VITALS:  /61   Pulse 78   Temp 98.4 °F (36.9 °C) (Oral)   Resp 16   Ht 5' 11\" (1.803 m)   Wt 210 lb (95.3 kg)   LMP 10/04/2019   SpO2 98%   Breastfeeding Unknown   BMI 29.29 kg/m²   Physical Exam  ABDOMEN:  normal bowel sounds, non-distended, non-tender and incision d/i  Ext nt    DATA:  Hemoglobin/Hematocrit:    Lab Results   Component Value Date    HGB 11.4 2020    HCT 35.4 2020       ASSESSMENT AND PLAN:  S/p ltcs  Active Problems:    Previous  delivery affecting pregnancy  Resolved Problems:    * No resolved hospital problems.  *    POD#2  Plan discharge home per patient request    Electronically signed by Alee Diaz DO on 2020 at 5:25 AM CMP/EKG/PT/PTT/CBC/INR/CXR EKG/INR/MRSa-negative/CMP/CXR/PT/PTT/CBC EKG/INR/MRSa-negative/ pregnancy test-negative/CMP/CXR/PT/PTT/CBC

## 2021-02-16 ENCOUNTER — APPOINTMENT (OUTPATIENT)
Dept: ORTHOPEDIC SURGERY | Facility: CLINIC | Age: 56
End: 2021-02-16
Payer: MEDICARE

## 2021-02-16 VITALS
HEART RATE: 65 BPM | OXYGEN SATURATION: 98 % | BODY MASS INDEX: 34.16 KG/M2 | WEIGHT: 205 LBS | DIASTOLIC BLOOD PRESSURE: 90 MMHG | HEIGHT: 65 IN | SYSTOLIC BLOOD PRESSURE: 138 MMHG

## 2021-02-16 DIAGNOSIS — M17.11 UNILATERAL PRIMARY OSTEOARTHRITIS, RIGHT KNEE: ICD-10-CM

## 2021-02-16 DIAGNOSIS — Z87.39 PERSONAL HISTORY OF OTHER DISEASES OF THE MUSCULOSKELETAL SYSTEM AND CONNECTIVE TISSUE: ICD-10-CM

## 2021-02-16 DIAGNOSIS — Z86.2 PERSONAL HISTORY OF DISEASES OF THE BLOOD AND BLOOD-FORMING ORGANS AND CERTAIN DISORDERS INVOLVING THE IMMUNE MECHANISM: ICD-10-CM

## 2021-02-16 DIAGNOSIS — Z86.39 PERSONAL HISTORY OF OTHER ENDOCRINE, NUTRITIONAL AND METABOLIC DISEASE: ICD-10-CM

## 2021-02-16 DIAGNOSIS — Z87.891 PERSONAL HISTORY OF NICOTINE DEPENDENCE: ICD-10-CM

## 2021-02-16 DIAGNOSIS — M25.552 PAIN IN LEFT HIP: ICD-10-CM

## 2021-02-16 DIAGNOSIS — Z78.9 OTHER SPECIFIED HEALTH STATUS: ICD-10-CM

## 2021-02-16 PROCEDURE — 73502 X-RAY EXAM HIP UNI 2-3 VIEWS: CPT | Mod: LT

## 2021-02-16 PROCEDURE — 99204 OFFICE O/P NEW MOD 45 MIN: CPT

## 2021-02-16 RX ORDER — OLMESARTAN MEDOXOMIL 40 MG/1
TABLET, FILM COATED ORAL
Refills: 0 | Status: ACTIVE | COMMUNITY

## 2021-02-16 RX ORDER — ATORVASTATIN CALCIUM 80 MG/1
TABLET, FILM COATED ORAL
Refills: 0 | Status: ACTIVE | COMMUNITY

## 2021-02-16 NOTE — PHYSICAL EXAM
[Coxalgic] : coxalgic [LE] : Sensory: Intact in bilateral lower extremities [Normal RLE] : Right Lower Extremity: No scars, rashes, lesions, ulcers, skin intact [Normal LLE] : Left Lower Extremity: No scars, rashes, lesions, ulcers, skin intact [Normal Touch] : sensation intact for touch [Normal] : Oriented to person, place, and time, insight and judgement were intact and the affect was normal [de-identified] : Hips\par antalgic/coxalgia gait.\par Tender in the groin.\par Pain with flexion and internal rotation LEFT hip which is much more limited than her RIGHT hip where there is no pain with flexion and rotation.\par On the LEFT side flexion is to about 120° and 5-10° internal rotation and 20° external rotation.\par She can do an active straight leg raise but has some mild groin pain.\par Nontender over the hamstrings posteriorly. No lumbar tenderness.\par Range of motion of the lumbar spine is without any back pain with forward flexion to her knees.\par Negative straight leg raise to about 70 degr\par  [de-identified] : Overweight [de-identified] : No respiratory distress or cough [de-identified] : \par AP pelvis and AP and lateral x-rays of the LEFT hip today show Mild narrowing LEFT hip joint space consistent with mild osteoarthritis. No bone lesions seen.\par \par MRI of the LEFT hip performed February 3, 2021 showed moderate degenerative changes, osteoarthritis LEFT hip worse compared to April 15, 2019 MRI of the LEFT hip. There was mild tendinosis and a low-grade partial intrasubstance tear and the hamstrings and uterine fibroids.\par \par MRI of the lumbar spine also performed February 3, 2021 was reviewed showing prior L2-L3 laminectomy,  mild spondylosis at L3-L4 and mild disc bulge L3-L4and mild spinal stenosis and lipomatosis in the epidural space posteriorly There was right-sided hydronephrosis and hydroureter from an obstructing ureteral calculus seen on prior CT on 1/12/21. diffuse L5-S1 disc bulge RIGHT greater than LEFT lateral recess stenosis impinging on the RIGHT S1 nerve root which is stable, L4-L5 disc bulge acentric to the LEFT, L2-L3 disc bulge with LEFT foraminal disc herniation and mild left-sided foraminal narrowing and mild stenosis which is stable and a mild disc bulge at L. T2-L1 the very small LEFT disc herniation

## 2021-02-16 NOTE — REVIEW OF SYSTEMS
[Negative] : Heme/Lymph [Joint Pain] : joint pain [SOB on Exertion] : shortness of breath on exertion [Dizziness] : dizziness [Sleep Disturbances] : ~T sleep disturbances [Joint Stiffness] : no joint stiffness

## 2021-02-16 NOTE — HISTORY OF PRESENT ILLNESS
[de-identified] : Ms. Galaviz is a 56 yo woman who presents for evaluation of Pain in her LEFT groin area that started 2 mos ago.\par plan laminectomylumbar spine L2-L3 in the past.\par There was no injury or trauma.\par She is being treated for kidney stones as well. She was supposed to have the kidney stone removed but her sugars apparently were not well controlled with her diabetes so they did not do the procedure.\par no flank pain. No back pain. Her pain is primarily in the anterior hip/groin area with some pain into the thigh. Occasionally the pain radiates below the knee. It's getting worse. Her pain management doctor gave her oxycodone and morphine to take They make her nauseous and constipated. She is also been taking Motrin 800 mg 3 times a day and takes aspirin every day.\par Pain is 10/10, constant and throbbing worse with walking and bending. No hip injections have been done.\par She does have a history of diabetes and is on insulin. Her sugars have been in the low 100s.\par She also has a history of sarcoidosis. She had biopsy of inguinal lymph nodes which showed inflammation in the past.

## 2021-02-16 NOTE — ASSESSMENT
[FreeTextEntry1] : 55-year-old woman with LEFT groin pain that is severe over the last couple months\par \par Cane-- use in the right hand.\par She can take the Motrin 800 mg and take narcotics if needed for severe pain but I cautioned her against using too much narcotics since they are dictated and can cause constipation and other side effects. The Motrin could aggravate her stomach which may also be an issue. I suggested trying Tylenol 2 tablets up to 3 times a day.\par I encouraged her to see the nephrologist regarding the kidney stone which is causing some mild hydronephrosis.\par I don't think that is causing her groin pain but should have it addressed and make sure if it's not to cause any permanent kidney issues.\par The pain in the groin really seems to be coming from the hip based on the exam and he seems related to her hip arthritis. That being said her pain is quite severe, more than I would expect given the degree of arthritis. There is some hamstring tendinopathy and small tearing which would not typically cause any groin pain and doesn't seem to be symptomatic on examination.\par In her lumbar spine she had L2-L3 laminectomy with moderate to severe disc space narrowing and a spondylolisthesis and a LEFT foraminal disc herniation with left-sided narrowing which potentially could cause pain to the LEFT thigh area.\par In order to try to determine the exact cause of pain which at this point seems to be the hip joint I suggested trying corticosteroid injection in her LEFT hip to see if this alleviates the pain. Even if it does so temporarily it would suggest that her pain is coming from her hip joint as opposed to radicular pain from the lumbar spine. Otherwise perhaps epidural injection may be considered.\par She is to be careful with her diabetes because the steroids can cause the glucose to go up so she should check carefully uncover if necessary and call her endocrinologist or myself if there any issues.\par She also has a history of sarcoidosis and had a lymph node biopsied in her LEFT groin in the past. At this point on the MRI and exam I do not feel any abnormal lymphadenopathy. There no distractive processes in the bone.\par Followup in 3 weeks to see how she is doing.\par

## 2021-03-08 PROBLEM — M16.12 OSTEOARTHRITIS OF LEFT HIP: Status: ACTIVE | Noted: 2021-02-16

## 2021-03-08 NOTE — HISTORY OF PRESENT ILLNESS
[de-identified] : Ms. Galaviz presents for evaluation of pain in her LEFT groin area that started 3 mos ago without any inciting event.\par s/p laminectomy lumbar spine L2-L3 in the past and  is being treated for kidney stones as well. She was supposed to have the kidney stone removed but her sugars apparently were not well controlled with her diabetes so they did not do the procedure.\par She is taking \par Pain is 10/10, constant and throbbing worse with walking and bending. No hip injections have been done.\par She does have a history of diabetes and is on insulin. Her sugars have been in the low 100s.\par She also has a history of sarcoidosis. She had biopsy of inguinal lymph nodes which showed inflammation in the past.

## 2021-03-08 NOTE — ASSESSMENT
[FreeTextEntry1] : 55-year-old woman with LEFT groin pain that is severe over the last couple months\par \par Cane-- use in the right hand.\par She can take the Motrin 800 mg and take narcotics if needed for severe pain but I cautioned her against using too much narcotics s\par The pain in the groin really seems to be coming from the hip based on the exam and he seems related to her hip arthritis. That being said her pain is quite severe, more than I would expect given the degree of arthritis. There is some hamstring tendinopathy and small tearing which would not typically cause any groin pain and doesn't seem to be symptomatic on examination.\par In her lumbar spine she had L2-L3 laminectomy with moderate to severe disc space narrowing and a spondylolisthesis and a LEFT foraminal disc herniation with left-sided narrowing which potentially could cause pain to the LEFT thigh area.\par In order to try to determine the exact cause of pain which at this point seems to be the hip joint I suggested trying corticosteroid injection in her LEFT hip to see if this alleviates the pain. Even if it does so temporarily it would suggest that her pain is coming from her hip joint as opposed to radicular pain from the lumbar spine. Otherwise perhaps epidural injection may be considered.\par She is to be careful with her diabetes because the steroids can cause the glucose to go up so she should check carefully uncover if necessary and call her endocrinologist or myself if there any issues.\par She also has a history of sarcoidosis and had a lymph node biopsied in her LEFT groin in the past. At this point on the MRI and exam I do not feel any abnormal lymphadenopathy. There no distractive processes in the bone.\par Followup in 3 weeks to see how she is doing.\par

## 2021-03-08 NOTE — PHYSICAL EXAM
PHYSICIAN NEXT STEPS:  Review Only    CHIEF COMPLAINT:  Chief Complaint/Protocol Used: Abdominal Pain - Male  Onset: TODAY      ASSESSMENT:  ? Onset: TODAY  ? Onset: STARTED EARLY THIS MORNING  ? Pattern: COME ADN GOES  ? Child's Appearance: ACTING FINE NOW  ? Recurrent Symptom: NO  ? Cause: THIS MORNING, VERY HARD STOOLS  -------------------------------------------------------    DISPOSITION:  Disposition Recommendation: See PCP When Office is Open (within 3 days)  Questions that led to disposition:  ? Caller thinks crying is caused by teething  Patient Directed To:  Unspecified  Patient Intended Action: Take care of myself at home        DISPOSITION OVERRIDE/PROVIDER CONSULT:  Disposition Override: N/A  Override Source: Unspecified  Consulted with PCP: No  Consulted with On-Call Physician: No    CALLER CONTACT INFO:  Name: Naida Owen (Mother)  Phone 1: [phone] (Home Phone)  Phone 2: [phone] (Mobile) - Preferred      ENCOUNTER STARTED:  10/28/20 11:15:25 AM  ENCOUNTER ASSIGNED TO/CLOSED BY:  Saul lemus @ 10/28/20 11:28:02 AM      -------------------------------------------------------    UNDERSTANDS CARE ADVICE: Yes    AGREES WITH CARE ADVICE: Yes    WILL FOLLOW CARE ADVICE: Yes    ------------------------------------------------------- [Coxalgic] : coxalgic [LE] : Sensory: Intact in bilateral lower extremities [Normal RLE] : Right Lower Extremity: No scars, rashes, lesions, ulcers, skin intact [Normal LLE] : Left Lower Extremity: No scars, rashes, lesions, ulcers, skin intact [Normal Touch] : sensation intact for touch [Normal] : Oriented to person, place, and time, insight and judgement were intact and the affect was normal [de-identified] : Hips\par antalgic/coxalgia gait.\par Tender in the groin.\par Pain with flexion and internal rotation LEFT hip which is much more limited than her RIGHT hip where there is no pain with flexion and rotation.\par On the LEFT side flexion is to about 120° and 5-10° internal rotation and 20° external rotation.\par She can do an active straight leg raise but has some mild groin pain.\par Nontender over the hamstrings posteriorly. No lumbar tenderness.\par Range of motion of the lumbar spine is without any back pain with forward flexion to her knees.\par Negative straight leg raise to about 70 degr\par  [de-identified] : Overweight [de-identified] : No respiratory distress or cough [de-identified] : \par AP pelvis and AP and lateral x-rays of the LEFT hip today show Mild narrowing LEFT hip joint space consistent with mild osteoarthritis. No bone lesions seen.\par \par MRI of the LEFT hip performed February 3, 2021 showed moderate degenerative changes, osteoarthritis LEFT hip worse compared to April 15, 2019 MRI of the LEFT hip. There was mild tendinosis and a low-grade partial intrasubstance tear and the hamstrings and uterine fibroids.\par \par MRI of the lumbar spine also performed February 3, 2021 was reviewed showing prior L2-L3 laminectomy,  mild spondylosis at L3-L4 and mild disc bulge L3-L4and mild spinal stenosis and lipomatosis in the epidural space posteriorly There was right-sided hydronephrosis and hydroureter from an obstructing ureteral calculus seen on prior CT on 1/12/21. diffuse L5-S1 disc bulge RIGHT greater than LEFT lateral recess stenosis impinging on the RIGHT S1 nerve root which is stable, L4-L5 disc bulge acentric to the LEFT, L2-L3 disc bulge with LEFT foraminal disc herniation and mild left-sided foraminal narrowing and mild stenosis which is stable and a mild disc bulge at L. T2-L1 the very small LEFT disc herniation

## 2021-03-09 ENCOUNTER — APPOINTMENT (OUTPATIENT)
Dept: ORTHOPEDIC SURGERY | Facility: CLINIC | Age: 56
End: 2021-03-09

## 2021-03-09 DIAGNOSIS — M48.061 SPINAL STENOSIS, LUMBAR REGION WITHOUT NEUROGENIC CLAUDICATION: ICD-10-CM

## 2021-03-09 DIAGNOSIS — M16.12 UNILATERAL PRIMARY OSTEOARTHRITIS, LEFT HIP: ICD-10-CM

## 2021-03-09 DIAGNOSIS — M79.652 PAIN IN LEFT THIGH: ICD-10-CM

## 2022-12-20 NOTE — CONSULT NOTE ADULT - PROBLEM SELECTOR RECOMMENDATION 5
continue inhalers as needed and keep well hydrated

## 2023-06-24 NOTE — ED PROVIDER NOTE - NS ED ATTENDING STATEMENT MOD
pt assessed by RN , but never saw a doctor. pt had no complaints and left without being seen by MD , stating he didn't want to stay in the ER anymore. v/s stable. pt left with a steady gait. MD and charge RN aware. I have reviewed and agree with all pertinent clinical information, including history and physical exam and agree with treatment plan of the PA.

## 2023-12-14 ENCOUNTER — NON-APPOINTMENT (OUTPATIENT)
Age: 58
End: 2023-12-14

## 2024-07-24 NOTE — PATIENT PROFILE ADULT. - TYPE OF ADMISSION, PATIENT PROFILE
Scheduled/Direct [Fine Needle Aspiration] : fine needle aspiration ~T ~C was performed [Area of Mass: ______] : mass identified in the [unfilled] [Risks] : risks [Benefits] : benefits [Alternatives] : alternatives [Consent Obtained] : written consent was obtained prior to the procedure and is detailed in the patient's record [Patient] : the patient [___ ml Inj] : [unfilled] ~Uml [1%] : 1% [Supine] : the patient was placed in the supine position with the neck extended as tolerated [Betadine] : with betadine solution [1 Pass] : 1 pass was made through the mass [Ultrasonic Guidance] : ultrasound guidance was employed [Tolerated Well] : the patient tolerated the procedure well [Hemostasis] : hemostasis was assured and the patient was discharged in satisfactory condition [No Complications] : there were no complications [Instructions Given] : handouts/patient instructions were given to patient [___ Week(s)] : in [unfilled] week(s) [de-identified] : 18 guage seroma catheter placed. [de-identified] : 140 mL purlulent fluid returned [de-identified] : Sent for culture.

## 2025-03-24 ENCOUNTER — APPOINTMENT (OUTPATIENT)
Dept: ORTHOPEDIC SURGERY | Facility: CLINIC | Age: 60
End: 2025-03-24

## 2025-03-27 ENCOUNTER — APPOINTMENT (OUTPATIENT)
Dept: ORTHOPEDIC SURGERY | Facility: CLINIC | Age: 60
End: 2025-03-27

## 2025-04-11 ENCOUNTER — APPOINTMENT (OUTPATIENT)
Dept: ORTHOPEDIC SURGERY | Facility: HOSPITAL | Age: 60
End: 2025-04-11

## 2025-04-16 ENCOUNTER — APPOINTMENT (OUTPATIENT)
Dept: PHYSICAL MEDICINE AND REHAB | Facility: CLINIC | Age: 60
End: 2025-04-16
Payer: MEDICARE

## 2025-04-16 ENCOUNTER — NON-APPOINTMENT (OUTPATIENT)
Age: 60
End: 2025-04-16

## 2025-04-16 VITALS
HEIGHT: 65 IN | OXYGEN SATURATION: 98 % | DIASTOLIC BLOOD PRESSURE: 92 MMHG | SYSTOLIC BLOOD PRESSURE: 141 MMHG | HEART RATE: 84 BPM | TEMPERATURE: 97.9 F | WEIGHT: 205 LBS | BODY MASS INDEX: 34.16 KG/M2

## 2025-04-16 DIAGNOSIS — R59.1 GENERALIZED ENLARGED LYMPH NODES: ICD-10-CM

## 2025-04-16 DIAGNOSIS — R52 PAIN, UNSPECIFIED: ICD-10-CM

## 2025-04-16 DIAGNOSIS — M51.26 OTHER INTERVERTEBRAL DISC DISPLACEMENT, LUMBAR REGION: ICD-10-CM

## 2025-04-16 DIAGNOSIS — M48.061 SPINAL STENOSIS, LUMBAR REGION WITHOUT NEUROGENIC CLAUDICATION: ICD-10-CM

## 2025-04-16 DIAGNOSIS — R26.9 UNSPECIFIED ABNORMALITIES OF GAIT AND MOBILITY: ICD-10-CM

## 2025-04-16 DIAGNOSIS — R41.89 OTHER SYMPTOMS AND SIGNS INVOLVING COGNITIVE FUNCTIONS AND AWARENESS: ICD-10-CM

## 2025-04-16 DIAGNOSIS — M62.552 MUSCLE WASTING AND ATROPHY, NOT ELSEWHERE CLASSIFIED, LEFT THIGH: ICD-10-CM

## 2025-04-16 DIAGNOSIS — R29.3 ABNORMAL POSTURE: ICD-10-CM

## 2025-04-16 DIAGNOSIS — M67.959 UNSPECIFIED DISORDER OF SYNOVIUM AND TENDON, UNSPECIFIED THIGH: ICD-10-CM

## 2025-04-16 DIAGNOSIS — M79.609 PAIN IN UNSPECIFIED LIMB: ICD-10-CM

## 2025-04-16 PROCEDURE — G2211 COMPLEX E/M VISIT ADD ON: CPT

## 2025-04-16 PROCEDURE — 99205 OFFICE O/P NEW HI 60 MIN: CPT

## 2025-04-30 NOTE — CONSULT NOTE ADULT - PROBLEM SELECTOR PROBLEM 1
[facility] - Periop Services  Brief Operative Note    Surgery Date: 4/30/2025     Surgeons and Role:     * Kirstin Petersen MD - Primary    Assisting Surgeon: None    Pre-op Diagnosis:  Kidney stones [N20.0]    Post-op Diagnosis:  Post-Op Diagnosis Codes:     * Kidney stones [N20.0]    Procedure(s) (LRB):  CYSTOURETEROSCOPY, WITH RETROGRADE PYELOGRAM AND URETERAL STENT INSERTION (Left)    Anesthesia: General    Operative Findings: small stone fragment noted in bladder; unable to traverse ureter with access sheath; placed 6 x24 JJ stent on left     Estimated Blood Loss: <5cc     Specimens:   Specimen (24h ago, onward)      None            ID Type Source Tests Collected by Time Destination   A :  Urine Urine, Catheterized URINALYSIS, REFLEX TO URINE CULTURE Kirstin Petersen MD 4/30/2025 [id number]            Discharge Note    OUTCOME: Patient tolerated treatment/procedure well without complication and is now ready for discharge.    DISPOSITION: Home or Self Care    FINAL DIAGNOSIS:  nephrolithiasis    FOLLOWUP: In OR for definitive stone management    DISCHARGE INSTRUCTIONS:    Discharge Procedure Orders   Urine Culture High Risk   Standing Status: Future Standing Exp. Date: 07/29/26       
Primary osteoarthritis of right knee

## 2025-09-23 PROBLEM — R74.8 ELEVATED SERUM GAMMA-GLUTAMYL TRANSFERASE LEVEL: Status: ACTIVE | Noted: 2025-09-23

## 2025-09-23 PROBLEM — G62.9 PERIPHERAL NEUROPATHY: Status: ACTIVE | Noted: 2025-09-23

## 2025-09-23 PROBLEM — R94.4 DECREASED GFR: Status: ACTIVE | Noted: 2025-09-23
